# Patient Record
Sex: MALE | Race: BLACK OR AFRICAN AMERICAN | NOT HISPANIC OR LATINO | Employment: OTHER | URBAN - METROPOLITAN AREA
[De-identification: names, ages, dates, MRNs, and addresses within clinical notes are randomized per-mention and may not be internally consistent; named-entity substitution may affect disease eponyms.]

---

## 2022-12-02 ENCOUNTER — OFFICE VISIT (OUTPATIENT)
Dept: GASTROENTEROLOGY | Facility: CLINIC | Age: 37
End: 2022-12-02

## 2022-12-02 VITALS
SYSTOLIC BLOOD PRESSURE: 159 MMHG | HEIGHT: 70 IN | HEART RATE: 142 BPM | TEMPERATURE: 97.8 F | DIASTOLIC BLOOD PRESSURE: 90 MMHG | WEIGHT: 191.9 LBS | BODY MASS INDEX: 27.47 KG/M2

## 2022-12-02 DIAGNOSIS — K82.4 GALLBLADDER POLYP: ICD-10-CM

## 2022-12-02 DIAGNOSIS — R10.9 ABDOMINAL SPASMS: Primary | ICD-10-CM

## 2022-12-02 PROCEDURE — 99204 OFFICE O/P NEW MOD 45 MIN: CPT | Performed by: INTERNAL MEDICINE

## 2022-12-02 RX ORDER — LANOLIN ALCOHOL/MO/W.PET/CERES
1000 CREAM (GRAM) TOPICAL DAILY
COMMUNITY
Start: 2022-11-07

## 2022-12-02 RX ORDER — ERGOCALCIFEROL 1.25 MG/1
CAPSULE ORAL
COMMUNITY
Start: 2022-09-15 | End: 2022-12-20 | Stop reason: ALTCHOICE

## 2022-12-02 RX ORDER — CYCLOBENZAPRINE HCL 5 MG
TABLET ORAL
COMMUNITY
Start: 2022-10-15 | End: 2022-12-20

## 2022-12-02 RX ORDER — PRAVASTATIN SODIUM 20 MG
20 TABLET ORAL DAILY
COMMUNITY
Start: 2022-09-15

## 2022-12-02 NOTE — PROGRESS NOTES
Chief Complaint   Patient presents with   • Abdominal Pain   • Cholelithiasis     Subjective   HPI  De Cahu is a 37 y.o. male who presents today for new patient evaluation.  He has been referred by his PCP for evaluation of abnormal ultrasound of the gallbladder.  The ultrasound was performed at [facility] and the report is noted below.  Ultrasound was performed due to complaint of intermittent spasm in the right upper quadrant.  This been going on for the last 4 months.  This is a transient sensation sometimes lasting only minutes.  Sometimes food related but not always.  No associated nausea or vomiting.  Denies any family history of gallbladder disease.  He reports some spasm in his lower back but nothing in his scapular area.  No heartburn or dyspepsia.        Exam: Ultrasound abdomen, complete exam.  DATE: 11/13/2022.    HISTORY: Abdominal pain    FINDINGS:   The liver appears grossly unremarkable. There are no focal hepatic abnormalities.   Multiple small echogenic nonshadowing foci are seen along the gallbladder wall measuring up to 10 mm in diameter, most consistent with gallbladder polyps. Alternatively this may represent focal adherent sludge.  The common bile duct measures 4 mm.   The pancreas is suboptimally visualized.  The kidneys appear grossly unremarkable.  The visualized portions of the spleen, aorta and IVC are normal.  There is no ascites.    IMPRESSION:     Multiple small echogenic nonshadowing foci are seen along the gallbladder wall measuring up to 10 mm in diameter, most consistent with gallbladder polyps. Alternatively this may represent focal adherent sludge.    Otherwise unremarkable exam.  Objective   Vitals:    12/02/22 0814   BP: 159/90   Pulse: (!) 142   Temp: 97.8 °F (36.6 °C)     Physical Exam  Vitals reviewed.   Constitutional:       Appearance: He is well-developed.   HENT:      Head: Normocephalic and atraumatic.   Abdominal:      General: Bowel sounds are normal.  There is no distension.      Palpations: Abdomen is soft. There is no mass.      Tenderness: There is no abdominal tenderness.      Hernia: No hernia is present.   Skin:     General: Skin is warm and dry.   Neurological:      Mental Status: He is alert and oriented to person, place, and time.   Psychiatric:         Behavior: Behavior normal.         Thought Content: Thought content normal.         Judgment: Judgment normal.              Assessment & Plan   Assessment:     1. Abdominal spasms    2. Gallbladder polyp      Plan:   Trial of Levsin SL for his c/o intermittent RUQ spasm.  Unclear if this is related to findings on RUQ u/s.  He will however need assessment by general surgery for gallbladder findings and to determine if lap aj is indicated.  Referral to [facility] has been made.              Joel Guillermo M.D.  [facility]  [address] [place] [address]  Office: [phone]

## 2022-12-20 ENCOUNTER — OFFICE VISIT (OUTPATIENT)
Dept: SURGERY | Facility: CLINIC | Age: 37
End: 2022-12-20

## 2022-12-20 VITALS — BODY MASS INDEX: 27.29 KG/M2 | HEIGHT: 70 IN | WEIGHT: 190.6 LBS

## 2022-12-20 DIAGNOSIS — K82.4 GALLBLADDER POLYP: Primary | ICD-10-CM

## 2022-12-20 PROCEDURE — 99203 OFFICE O/P NEW LOW 30 MIN: CPT | Performed by: SURGERY

## 2022-12-20 RX ORDER — CHLORAL HYDRATE 500 MG
CAPSULE ORAL
COMMUNITY

## 2022-12-20 RX ORDER — SODIUM CHLORIDE 0.9 % (FLUSH) 0.9 %
10 SYRINGE (ML) INJECTION EVERY 12 HOURS SCHEDULED
Status: CANCELLED | OUTPATIENT
Start: 2022-12-28

## 2022-12-20 RX ORDER — SODIUM CHLORIDE 9 MG/ML
40 INJECTION, SOLUTION INTRAVENOUS AS NEEDED
Status: CANCELLED | OUTPATIENT
Start: 2022-12-28

## 2022-12-20 RX ORDER — CEFAZOLIN SODIUM 2 G/100ML
2 INJECTION, SOLUTION INTRAVENOUS ONCE
Status: CANCELLED | OUTPATIENT
Start: 2022-12-28 | End: 2022-12-20

## 2022-12-20 RX ORDER — CELECOXIB 200 MG/1
200 CAPSULE ORAL ONCE
Status: CANCELLED | OUTPATIENT
Start: 2022-12-28 | End: 2022-12-20

## 2022-12-20 RX ORDER — SODIUM CHLORIDE 0.9 % (FLUSH) 0.9 %
10 SYRINGE (ML) INJECTION AS NEEDED
Status: CANCELLED | OUTPATIENT
Start: 2022-12-28

## 2022-12-20 RX ORDER — ONDANSETRON 2 MG/ML
4 INJECTION INTRAMUSCULAR; INTRAVENOUS EVERY 6 HOURS PRN
Status: CANCELLED | OUTPATIENT
Start: 2022-12-20

## 2022-12-20 NOTE — PROGRESS NOTES
"CC: Gallbladder polyp     HPI: 37-year-old male that was referred by Dr. Mann for evaluation of gallbladder polyp.  Patient has been having intermittent episodes of sharp right upper quadrant abdominal pain.  The pain last for several seconds and usually go away with time.  He has not been associated with any nausea or vomiting.  The patient underwent right upper quadrant ultrasound that show evidence of clumped stones versus 10 mm gallbladder polyp.  He denies any recent weight loss.  He was seen by Dr. Mann the recommended Levsin.  He reports improvement of his symptoms     PMH: High cholesterol     PSH: Open appendectomy 1995    MEDS: Omega-3 fatty acids, Pravachol, vitamin B12, vitamin D, Levsin     ALL: No known drug allergies    FH and SH: Family history of colon polyps, kidney cancer in his mom.  Family history of colon cancer in a paternal aunt.  Patient smokes marijuana daily, denies alcohol abuse or any other drugs use     ROS:   Constitutional: denies any weight changes, fatigue or weakness.  HEENT: Denies hearing loss and rhinorrhea  Cardiovascular: denies chest pain, palpitations, edemas.  Respiratory: denies cough, sputum, SOB.  Gastrointestinal: denies N&V, reports abd pain, diarrhea, constipation.  Genitourinary: denies dysuria, frequency.  Endocrine: denies cold intolerance, lethargy and flushing.  Hem: denies excessive bruising and postop bleeding.  Musculoskeletal: denies weakness, joint swelling, pain or stiffness.  Neuro: denies seizures, CVA, paresthesia, or peripheral neuropathy.   Skin: denies change in nevi, rashes, masses.     PE:   Vitals:    12/20/22 1256   Weight: 86.5 kg (190 lb 9.6 oz)   Height: 177.8 cm (70\")     Body mass index is 27.35 kg/m².   Alert and oriented ×3, no acute distress.  Head is normocephalic and atraumatic.  Neck is supple there is no thyromegaly or lymphadenopathy  Chest is clear bilaterally there is no added sounds  Regular rate and rhythm, no " murmurs  Abdomen is soft and nontender, is nondistended, bowel sounds are positive.  There is no rebound or guarding is and there is no peritoneal signs.  There is a well-healed infraumbilical incision.  Small umbilical hernia  No clubbing cyanosis or edema in lower or upper extremities    Diagnostic studies:   Ultrasound 11/13/2022:   IMPRESSION:     Multiple small echogenic nonshadowing foci are seen along the gallbladder wall measuring up to 10 mm in diameter, most consistent with gallbladder polyps. Alternatively this may represent focal adherent sludge.     Otherwise unremarkable exam.     Assessment and plan    The patient is a very pleasant with gallbladder polyp of 1 cm.  His symptoms do not seem consistent with gallbladder disease.  Discussed with him that regardless because of the large polyp at the gallbladder done my recommendation for him will be to have laparoscopic cholecystectomy.   This is based on the fact that polyps are larger than 0.7 cm have higher risk of developing gallbladder cancer.  I offered patient laparoscopic cholecystectomy with intraoperative cholangiogram.  I offer him to fix his umbilical hernia at the same time if a port is placed at the time of surgery.  No mesh will be used.  He understood risk and benefits of procedure including bleeding, infection, intra-abdominal injury, bile duct injury, cystic duct leak discussed in detail with the patient that verbalized understanding and agree with the plan    Surgical scheduling started     Vince Jaramillo MD  General, Minimally Invasive and Endoscopic Surgery  [facility]     [address], [address]  [place], [place], [address]  P: [phone]  F: [phone]

## 2022-12-20 NOTE — H&P (VIEW-ONLY)
"CC: Gallbladder polyp     HPI: 37-year-old male that was referred by Dr. Mann for evaluation of gallbladder polyp.  Patient has been having intermittent episodes of sharp right upper quadrant abdominal pain.  The pain last for several seconds and usually go away with time.  He has not been associated with any nausea or vomiting.  The patient underwent right upper quadrant ultrasound that show evidence of clumped stones versus 10 mm gallbladder polyp.  He denies any recent weight loss.  He was seen by Dr. Mann the recommended Levsin.  He reports improvement of his symptoms     PMH: High cholesterol     PSH: Open appendectomy 1995    MEDS: Omega-3 fatty acids, Pravachol, vitamin B12, vitamin D, Levsin     ALL: No known drug allergies    FH and SH: Family history of colon polyps, kidney cancer in his mom.  Family history of colon cancer in a paternal aunt.  Patient smokes marijuana daily, denies alcohol abuse or any other drugs use     ROS:   Constitutional: denies any weight changes, fatigue or weakness.  HEENT: Denies hearing loss and rhinorrhea  Cardiovascular: denies chest pain, palpitations, edemas.  Respiratory: denies cough, sputum, SOB.  Gastrointestinal: denies N&V, reports abd pain, diarrhea, constipation.  Genitourinary: denies dysuria, frequency.  Endocrine: denies cold intolerance, lethargy and flushing.  Hem: denies excessive bruising and postop bleeding.  Musculoskeletal: denies weakness, joint swelling, pain or stiffness.  Neuro: denies seizures, CVA, paresthesia, or peripheral neuropathy.   Skin: denies change in nevi, rashes, masses.     PE:   Vitals:    12/20/22 1256   Weight: 86.5 kg (190 lb 9.6 oz)   Height: 177.8 cm (70\")     Body mass index is 27.35 kg/m².   Alert and oriented ×3, no acute distress.  Head is normocephalic and atraumatic.  Neck is supple there is no thyromegaly or lymphadenopathy  Chest is clear bilaterally there is no added sounds  Regular rate and rhythm, no " murmurs  Abdomen is soft and nontender, is nondistended, bowel sounds are positive.  There is no rebound or guarding is and there is no peritoneal signs.  There is a well-healed infraumbilical incision.  Small umbilical hernia  No clubbing cyanosis or edema in lower or upper extremities    Diagnostic studies:   Ultrasound 11/13/2022:   IMPRESSION:     Multiple small echogenic nonshadowing foci are seen along the gallbladder wall measuring up to 10 mm in diameter, most consistent with gallbladder polyps. Alternatively this may represent focal adherent sludge.     Otherwise unremarkable exam.     Assessment and plan    The patient is a very pleasant with gallbladder polyp of 1 cm.  His symptoms do not seem consistent with gallbladder disease.  Discussed with him that regardless because of the large polyp at the gallbladder done my recommendation for him will be to have laparoscopic cholecystectomy.   This is based on the fact that polyps are larger than 0.7 cm have higher risk of developing gallbladder cancer.  I offered patient laparoscopic cholecystectomy with intraoperative cholangiogram.  I offer him to fix his umbilical hernia at the same time if a port is placed at the time of surgery.  No mesh will be used.  He understood risk and benefits of procedure including bleeding, infection, intra-abdominal injury, bile duct injury, cystic duct leak discussed in detail with the patient that verbalized understanding and agree with the plan    Surgical scheduling started     Vince Jaramillo MD  General, Minimally Invasive and Endoscopic Surgery  [facility]     [address], [address]  [place], [place], [address]  P: [phone]  F: [phone]

## 2022-12-22 ENCOUNTER — PRE-ADMISSION TESTING (OUTPATIENT)
Dept: PREADMISSION TESTING | Facility: HOSPITAL | Age: 37
End: 2022-12-22

## 2022-12-22 VITALS
DIASTOLIC BLOOD PRESSURE: 70 MMHG | BODY MASS INDEX: 27.77 KG/M2 | OXYGEN SATURATION: 100 % | HEART RATE: 117 BPM | WEIGHT: 194 LBS | HEIGHT: 70 IN | RESPIRATION RATE: 16 BRPM | SYSTOLIC BLOOD PRESSURE: 119 MMHG | TEMPERATURE: 99.3 F

## 2022-12-22 DIAGNOSIS — K82.4 GALLBLADDER POLYP: ICD-10-CM

## 2022-12-22 LAB
ALBUMIN SERPL-MCNC: 5.1 G/DL (ref 3.5–5.2)
ALBUMIN/GLOB SERPL: 2.6 G/DL
ALP SERPL-CCNC: 59 U/L (ref 39–117)
ALT SERPL W P-5'-P-CCNC: 11 U/L (ref 1–41)
ANION GAP SERPL CALCULATED.3IONS-SCNC: 12.8 MMOL/L (ref 5–15)
AST SERPL-CCNC: 12 U/L (ref 1–40)
BILIRUB SERPL-MCNC: 0.4 MG/DL (ref 0–1.2)
BUN SERPL-MCNC: 9 MG/DL (ref 6–20)
BUN/CREAT SERPL: 7.7 (ref 7–25)
CALCIUM SPEC-SCNC: 9.7 MG/DL (ref 8.6–10.5)
CHLORIDE SERPL-SCNC: 104 MMOL/L (ref 98–107)
CO2 SERPL-SCNC: 26.2 MMOL/L (ref 22–29)
CREAT SERPL-MCNC: 1.17 MG/DL (ref 0.76–1.27)
DEPRECATED RDW RBC AUTO: 41.3 FL (ref 37–54)
EGFRCR SERPLBLD CKD-EPI 2021: 82.3 ML/MIN/1.73
ERYTHROCYTE [DISTWIDTH] IN BLOOD BY AUTOMATED COUNT: 13.3 % (ref 12.3–15.4)
GLOBULIN UR ELPH-MCNC: 2 GM/DL
GLUCOSE SERPL-MCNC: 102 MG/DL (ref 65–99)
HCT VFR BLD AUTO: 38.6 % (ref 37.5–51)
HGB BLD-MCNC: 12.7 G/DL (ref 13–17.7)
MCH RBC QN AUTO: 27.9 PG (ref 26.6–33)
MCHC RBC AUTO-ENTMCNC: 32.9 G/DL (ref 31.5–35.7)
MCV RBC AUTO: 84.6 FL (ref 79–97)
PLATELET # BLD AUTO: 324 10*3/MM3 (ref 140–450)
PMV BLD AUTO: 9.4 FL (ref 6–12)
POTASSIUM SERPL-SCNC: 4.2 MMOL/L (ref 3.5–5.2)
PROT SERPL-MCNC: 7.1 G/DL (ref 6–8.5)
QT INTERVAL: 341 MS
RBC # BLD AUTO: 4.56 10*6/MM3 (ref 4.14–5.8)
SODIUM SERPL-SCNC: 143 MMOL/L (ref 136–145)
WBC NRBC COR # BLD: 4.75 10*3/MM3 (ref 3.4–10.8)

## 2022-12-22 PROCEDURE — 36415 COLL VENOUS BLD VENIPUNCTURE: CPT

## 2022-12-22 PROCEDURE — 93005 ELECTROCARDIOGRAM TRACING: CPT

## 2022-12-22 PROCEDURE — 85027 COMPLETE CBC AUTOMATED: CPT

## 2022-12-22 PROCEDURE — 80053 COMPREHEN METABOLIC PANEL: CPT

## 2022-12-22 PROCEDURE — 93010 ELECTROCARDIOGRAM REPORT: CPT | Performed by: INTERNAL MEDICINE

## 2022-12-22 RX ORDER — CHLORHEXIDINE GLUCONATE 500 MG/1
CLOTH TOPICAL TAKE AS DIRECTED
COMMUNITY
End: 2022-12-28 | Stop reason: HOSPADM

## 2022-12-22 NOTE — DISCHARGE INSTRUCTIONS

## 2022-12-28 ENCOUNTER — ANESTHESIA (OUTPATIENT)
Dept: PERIOP | Facility: HOSPITAL | Age: 37
End: 2022-12-28

## 2022-12-28 ENCOUNTER — APPOINTMENT (OUTPATIENT)
Dept: GENERAL RADIOLOGY | Facility: HOSPITAL | Age: 37
End: 2022-12-28

## 2022-12-28 ENCOUNTER — HOSPITAL ENCOUNTER (OUTPATIENT)
Facility: HOSPITAL | Age: 37
Setting detail: HOSPITAL OUTPATIENT SURGERY
Discharge: HOME | End: 2022-12-28
Attending: SURGERY | Admitting: SURGERY

## 2022-12-28 ENCOUNTER — ANESTHESIA EVENT (OUTPATIENT)
Dept: PERIOP | Facility: HOSPITAL | Age: 37
End: 2022-12-28

## 2022-12-28 VITALS
BODY MASS INDEX: 27.84 KG/M2 | RESPIRATION RATE: 16 BRPM | DIASTOLIC BLOOD PRESSURE: 84 MMHG | HEIGHT: 70 IN | OXYGEN SATURATION: 96 % | SYSTOLIC BLOOD PRESSURE: 147 MMHG | HEART RATE: 76 BPM | TEMPERATURE: 98.7 F

## 2022-12-28 DIAGNOSIS — K82.4 GALLBLADDER POLYP: ICD-10-CM

## 2022-12-28 PROCEDURE — 25010000002 HYDROMORPHONE PER 4 MG: Performed by: NURSE ANESTHETIST, CERTIFIED REGISTERED

## 2022-12-28 PROCEDURE — 25010000002 MIDAZOLAM PER 1 MG: Performed by: ANESTHESIOLOGY

## 2022-12-28 PROCEDURE — 74300 X-RAY BILE DUCTS/PANCREAS: CPT

## 2022-12-28 PROCEDURE — 25010000002 PROPOFOL 10 MG/ML EMULSION: Performed by: NURSE ANESTHETIST, CERTIFIED REGISTERED

## 2022-12-28 PROCEDURE — 25010000002 HYDROMORPHONE 1 MG/ML SOLUTION: Performed by: NURSE ANESTHETIST, CERTIFIED REGISTERED

## 2022-12-28 PROCEDURE — 25010000002 ONDANSETRON PER 1 MG: Performed by: NURSE ANESTHETIST, CERTIFIED REGISTERED

## 2022-12-28 PROCEDURE — 25010000002 FENTANYL CITRATE (PF) 50 MCG/ML SOLUTION: Performed by: NURSE ANESTHETIST, CERTIFIED REGISTERED

## 2022-12-28 PROCEDURE — 25010000002 NEOSTIGMINE 5 MG/10ML SOLUTION: Performed by: NURSE ANESTHETIST, CERTIFIED REGISTERED

## 2022-12-28 PROCEDURE — 25010000002 CEFAZOLIN IN DEXTROSE 2-4 GM/100ML-% SOLUTION: Performed by: SURGERY

## 2022-12-28 PROCEDURE — 0 IOTHALAMATE 60 % SOLUTION: Performed by: SURGERY

## 2022-12-28 PROCEDURE — 47563 LAPARO CHOLECYSTECTOMY/GRAPH: CPT | Performed by: SURGERY

## 2022-12-28 PROCEDURE — 88304 TISSUE EXAM BY PATHOLOGIST: CPT | Performed by: SURGERY

## 2022-12-28 PROCEDURE — 47563 LAPARO CHOLECYSTECTOMY/GRAPH: CPT | Performed by: SPECIALIST/TECHNOLOGIST, OTHER

## 2022-12-28 PROCEDURE — 25010000002 DEXAMETHASONE SODIUM PHOSPHATE 20 MG/5ML SOLUTION: Performed by: NURSE ANESTHETIST, CERTIFIED REGISTERED

## 2022-12-28 DEVICE — HEMOLOK ML 6 CLIPS/CART
Type: IMPLANTABLE DEVICE | Site: ABDOMEN | Status: FUNCTIONAL
Brand: WECK

## 2022-12-28 DEVICE — HORIZON TI ML 6 CLIPS/CART
Type: IMPLANTABLE DEVICE | Site: ABDOMEN | Status: FUNCTIONAL
Brand: WECK

## 2022-12-28 RX ORDER — HYDROMORPHONE HYDROCHLORIDE 1 MG/ML
0.5 INJECTION, SOLUTION INTRAMUSCULAR; INTRAVENOUS; SUBCUTANEOUS
Status: DISCONTINUED | OUTPATIENT
Start: 2022-12-28 | End: 2022-12-28 | Stop reason: HOSPADM

## 2022-12-28 RX ORDER — HYDROCODONE BITARTRATE AND ACETAMINOPHEN 7.5; 325 MG/1; MG/1
1 TABLET ORAL ONCE AS NEEDED
Status: DISCONTINUED | OUTPATIENT
Start: 2022-12-28 | End: 2022-12-28 | Stop reason: HOSPADM

## 2022-12-28 RX ORDER — SODIUM CHLORIDE, SODIUM LACTATE, POTASSIUM CHLORIDE, CALCIUM CHLORIDE 600; 310; 30; 20 MG/100ML; MG/100ML; MG/100ML; MG/100ML
9 INJECTION, SOLUTION INTRAVENOUS CONTINUOUS PRN
Status: DISCONTINUED | OUTPATIENT
Start: 2022-12-28 | End: 2022-12-28 | Stop reason: HOSPADM

## 2022-12-28 RX ORDER — NALOXONE HCL 0.4 MG/ML
0.2 VIAL (ML) INJECTION AS NEEDED
Status: DISCONTINUED | OUTPATIENT
Start: 2022-12-28 | End: 2022-12-28 | Stop reason: HOSPADM

## 2022-12-28 RX ORDER — MAGNESIUM HYDROXIDE 1200 MG/15ML
LIQUID ORAL AS NEEDED
Status: DISCONTINUED | OUTPATIENT
Start: 2022-12-28 | End: 2022-12-28 | Stop reason: HOSPADM

## 2022-12-28 RX ORDER — FAMOTIDINE 10 MG/ML
20 INJECTION, SOLUTION INTRAVENOUS
Status: COMPLETED | OUTPATIENT
Start: 2022-12-28 | End: 2022-12-28

## 2022-12-28 RX ORDER — SODIUM CHLORIDE 9 MG/ML
40 INJECTION, SOLUTION INTRAVENOUS AS NEEDED
Status: DISCONTINUED | OUTPATIENT
Start: 2022-12-28 | End: 2022-12-28 | Stop reason: HOSPADM

## 2022-12-28 RX ORDER — DIPHENHYDRAMINE HYDROCHLORIDE 50 MG/ML
12.5 INJECTION INTRAMUSCULAR; INTRAVENOUS
Status: DISCONTINUED | OUTPATIENT
Start: 2022-12-28 | End: 2022-12-28 | Stop reason: HOSPADM

## 2022-12-28 RX ORDER — GLYCOPYRROLATE 0.2 MG/ML
INJECTION INTRAMUSCULAR; INTRAVENOUS AS NEEDED
Status: DISCONTINUED | OUTPATIENT
Start: 2022-12-28 | End: 2022-12-28 | Stop reason: SURG

## 2022-12-28 RX ORDER — DEXAMETHASONE SODIUM PHOSPHATE 4 MG/ML
INJECTION, SOLUTION INTRA-ARTICULAR; INTRALESIONAL; INTRAMUSCULAR; INTRAVENOUS; SOFT TISSUE AS NEEDED
Status: DISCONTINUED | OUTPATIENT
Start: 2022-12-28 | End: 2022-12-28 | Stop reason: SURG

## 2022-12-28 RX ORDER — OXYCODONE HYDROCHLORIDE AND ACETAMINOPHEN 5; 325 MG/1; MG/1
1 TABLET ORAL EVERY 6 HOURS PRN
Qty: 20 TABLET | Refills: 0 | Status: SHIPPED | OUTPATIENT
Start: 2022-12-28

## 2022-12-28 RX ORDER — FENTANYL CITRATE 50 UG/ML
50 INJECTION, SOLUTION INTRAMUSCULAR; INTRAVENOUS
Status: DISCONTINUED | OUTPATIENT
Start: 2022-12-28 | End: 2022-12-28 | Stop reason: HOSPADM

## 2022-12-28 RX ORDER — ESMOLOL HYDROCHLORIDE 10 MG/ML
INJECTION INTRAVENOUS AS NEEDED
Status: DISCONTINUED | OUTPATIENT
Start: 2022-12-28 | End: 2022-12-28 | Stop reason: SURG

## 2022-12-28 RX ORDER — PROMETHAZINE HYDROCHLORIDE 25 MG/1
12.5 TABLET ORAL ONCE AS NEEDED
Status: DISCONTINUED | OUTPATIENT
Start: 2022-12-28 | End: 2022-12-28 | Stop reason: HOSPADM

## 2022-12-28 RX ORDER — MIDAZOLAM HYDROCHLORIDE 1 MG/ML
1 INJECTION INTRAMUSCULAR; INTRAVENOUS
Status: DISCONTINUED | OUTPATIENT
Start: 2022-12-28 | End: 2022-12-28 | Stop reason: HOSPADM

## 2022-12-28 RX ORDER — SODIUM CHLORIDE 0.9 % (FLUSH) 0.9 %
10 SYRINGE (ML) INJECTION AS NEEDED
Status: DISCONTINUED | OUTPATIENT
Start: 2022-12-28 | End: 2022-12-28 | Stop reason: HOSPADM

## 2022-12-28 RX ORDER — BUPIVACAINE HYDROCHLORIDE AND EPINEPHRINE 5; 5 MG/ML; UG/ML
INJECTION, SOLUTION EPIDURAL; INTRACAUDAL; PERINEURAL AS NEEDED
Status: DISCONTINUED | OUTPATIENT
Start: 2022-12-28 | End: 2022-12-28 | Stop reason: HOSPADM

## 2022-12-28 RX ORDER — AMOXICILLIN 250 MG
2 CAPSULE ORAL DAILY PRN
Qty: 30 TABLET | Refills: 1 | Status: SHIPPED | OUTPATIENT
Start: 2022-12-28 | End: 2023-12-28

## 2022-12-28 RX ORDER — OXYCODONE AND ACETAMINOPHEN 7.5; 325 MG/1; MG/1
1 TABLET ORAL EVERY 4 HOURS PRN
Status: DISCONTINUED | OUTPATIENT
Start: 2022-12-28 | End: 2022-12-28 | Stop reason: HOSPADM

## 2022-12-28 RX ORDER — NEOSTIGMINE METHYLSULFATE 0.5 MG/ML
INJECTION, SOLUTION INTRAVENOUS AS NEEDED
Status: DISCONTINUED | OUTPATIENT
Start: 2022-12-28 | End: 2022-12-28 | Stop reason: SURG

## 2022-12-28 RX ORDER — EPHEDRINE SULFATE 50 MG/ML
5 INJECTION, SOLUTION INTRAVENOUS ONCE AS NEEDED
Status: DISCONTINUED | OUTPATIENT
Start: 2022-12-28 | End: 2022-12-28 | Stop reason: HOSPADM

## 2022-12-28 RX ORDER — SODIUM CHLORIDE 9 MG/ML
INJECTION, SOLUTION INTRAVENOUS AS NEEDED
Status: DISCONTINUED | OUTPATIENT
Start: 2022-12-28 | End: 2022-12-28 | Stop reason: HOSPADM

## 2022-12-28 RX ORDER — CELECOXIB 200 MG/1
200 CAPSULE ORAL ONCE
Status: COMPLETED | OUTPATIENT
Start: 2022-12-28 | End: 2022-12-28

## 2022-12-28 RX ORDER — HYDRALAZINE HYDROCHLORIDE 20 MG/ML
5 INJECTION INTRAMUSCULAR; INTRAVENOUS
Status: DISCONTINUED | OUTPATIENT
Start: 2022-12-28 | End: 2022-12-28 | Stop reason: HOSPADM

## 2022-12-28 RX ORDER — LABETALOL HYDROCHLORIDE 5 MG/ML
5 INJECTION, SOLUTION INTRAVENOUS
Status: DISCONTINUED | OUTPATIENT
Start: 2022-12-28 | End: 2022-12-28 | Stop reason: HOSPADM

## 2022-12-28 RX ORDER — FLUMAZENIL 0.1 MG/ML
0.2 INJECTION INTRAVENOUS AS NEEDED
Status: DISCONTINUED | OUTPATIENT
Start: 2022-12-28 | End: 2022-12-28 | Stop reason: HOSPADM

## 2022-12-28 RX ORDER — PROMETHAZINE HYDROCHLORIDE 25 MG/1
25 SUPPOSITORY RECTAL ONCE AS NEEDED
Status: DISCONTINUED | OUTPATIENT
Start: 2022-12-28 | End: 2022-12-28 | Stop reason: HOSPADM

## 2022-12-28 RX ORDER — ONDANSETRON 2 MG/ML
4 INJECTION INTRAMUSCULAR; INTRAVENOUS EVERY 6 HOURS PRN
Status: DISCONTINUED | OUTPATIENT
Start: 2022-12-28 | End: 2022-12-28 | Stop reason: HOSPADM

## 2022-12-28 RX ORDER — PROMETHAZINE HYDROCHLORIDE 25 MG/1
25 TABLET ORAL ONCE AS NEEDED
Status: DISCONTINUED | OUTPATIENT
Start: 2022-12-28 | End: 2022-12-28 | Stop reason: HOSPADM

## 2022-12-28 RX ORDER — DIPHENHYDRAMINE HCL 25 MG
25 CAPSULE ORAL
Status: DISCONTINUED | OUTPATIENT
Start: 2022-12-28 | End: 2022-12-28 | Stop reason: HOSPADM

## 2022-12-28 RX ORDER — ONDANSETRON 4 MG/1
4 TABLET, FILM COATED ORAL EVERY 8 HOURS PRN
Qty: 10 TABLET | Refills: 0 | Status: SHIPPED | OUTPATIENT
Start: 2022-12-28 | End: 2023-12-28

## 2022-12-28 RX ORDER — PROPOFOL 10 MG/ML
VIAL (ML) INTRAVENOUS AS NEEDED
Status: DISCONTINUED | OUTPATIENT
Start: 2022-12-28 | End: 2022-12-28 | Stop reason: SURG

## 2022-12-28 RX ORDER — SODIUM CHLORIDE 0.9 % (FLUSH) 0.9 %
10 SYRINGE (ML) INJECTION EVERY 12 HOURS SCHEDULED
Status: DISCONTINUED | OUTPATIENT
Start: 2022-12-28 | End: 2022-12-28 | Stop reason: HOSPADM

## 2022-12-28 RX ORDER — FENTANYL CITRATE 50 UG/ML
INJECTION, SOLUTION INTRAMUSCULAR; INTRAVENOUS AS NEEDED
Status: DISCONTINUED | OUTPATIENT
Start: 2022-12-28 | End: 2022-12-28 | Stop reason: SURG

## 2022-12-28 RX ORDER — ONDANSETRON 2 MG/ML
INJECTION INTRAMUSCULAR; INTRAVENOUS AS NEEDED
Status: DISCONTINUED | OUTPATIENT
Start: 2022-12-28 | End: 2022-12-28 | Stop reason: SURG

## 2022-12-28 RX ORDER — CEFAZOLIN SODIUM 2 G/100ML
2 INJECTION, SOLUTION INTRAVENOUS ONCE
Status: COMPLETED | OUTPATIENT
Start: 2022-12-28 | End: 2022-12-28

## 2022-12-28 RX ORDER — LIDOCAINE HYDROCHLORIDE 20 MG/ML
INJECTION, SOLUTION INTRAVENOUS AS NEEDED
Status: DISCONTINUED | OUTPATIENT
Start: 2022-12-28 | End: 2022-12-28 | Stop reason: SURG

## 2022-12-28 RX ORDER — ROCURONIUM BROMIDE 10 MG/ML
INJECTION, SOLUTION INTRAVENOUS AS NEEDED
Status: DISCONTINUED | OUTPATIENT
Start: 2022-12-28 | End: 2022-12-28 | Stop reason: SURG

## 2022-12-28 RX ORDER — PHENYLEPHRINE HCL IN 0.9% NACL 1 MG/10 ML
SYRINGE (ML) INTRAVENOUS AS NEEDED
Status: DISCONTINUED | OUTPATIENT
Start: 2022-12-28 | End: 2022-12-28 | Stop reason: SURG

## 2022-12-28 RX ORDER — ONDANSETRON 2 MG/ML
4 INJECTION INTRAMUSCULAR; INTRAVENOUS ONCE AS NEEDED
Status: DISCONTINUED | OUTPATIENT
Start: 2022-12-28 | End: 2022-12-28 | Stop reason: HOSPADM

## 2022-12-28 RX ORDER — DOCUSATE SODIUM 100 MG/1
100 CAPSULE, LIQUID FILLED ORAL 2 TIMES DAILY PRN
Status: DISCONTINUED | OUTPATIENT
Start: 2022-12-28 | End: 2022-12-28 | Stop reason: HOSPADM

## 2022-12-28 RX ADMIN — FENTANYL CITRATE 50 MCG: 50 INJECTION, SOLUTION INTRAMUSCULAR; INTRAVENOUS at 11:29

## 2022-12-28 RX ADMIN — PROPOFOL 200 MG: 10 INJECTION, EMULSION INTRAVENOUS at 09:56

## 2022-12-28 RX ADMIN — FAMOTIDINE 20 MG: 10 INJECTION INTRAVENOUS at 08:45

## 2022-12-28 RX ADMIN — HYDROMORPHONE HYDROCHLORIDE 0.5 MG: 1 INJECTION, SOLUTION INTRAMUSCULAR; INTRAVENOUS; SUBCUTANEOUS at 11:45

## 2022-12-28 RX ADMIN — HYDROMORPHONE HYDROCHLORIDE 1 MG: 1 INJECTION, SOLUTION INTRAMUSCULAR; INTRAVENOUS; SUBCUTANEOUS at 09:53

## 2022-12-28 RX ADMIN — ROCURONIUM BROMIDE 40 MG: 10 INJECTION, SOLUTION INTRAVENOUS at 09:56

## 2022-12-28 RX ADMIN — ESMOLOL HYDROCHLORIDE 10 MG: 100 INJECTION, SOLUTION INTRAVENOUS at 10:13

## 2022-12-28 RX ADMIN — SODIUM CHLORIDE, POTASSIUM CHLORIDE, SODIUM LACTATE AND CALCIUM CHLORIDE: 600; 310; 30; 20 INJECTION, SOLUTION INTRAVENOUS at 10:58

## 2022-12-28 RX ADMIN — DEXAMETHASONE SODIUM PHOSPHATE 8 MG: 4 INJECTION, SOLUTION INTRAMUSCULAR; INTRAVENOUS at 10:06

## 2022-12-28 RX ADMIN — CELECOXIB 200 MG: 200 CAPSULE ORAL at 08:33

## 2022-12-28 RX ADMIN — ESMOLOL HYDROCHLORIDE 10 MG: 100 INJECTION, SOLUTION INTRAVENOUS at 10:04

## 2022-12-28 RX ADMIN — OXYCODONE HYDROCHLORIDE AND ACETAMINOPHEN 1 TABLET: 7.5; 325 TABLET ORAL at 11:24

## 2022-12-28 RX ADMIN — FENTANYL CITRATE 50 MCG: 50 INJECTION, SOLUTION INTRAMUSCULAR; INTRAVENOUS at 10:58

## 2022-12-28 RX ADMIN — MIDAZOLAM 1 MG: 1 INJECTION INTRAMUSCULAR; INTRAVENOUS at 09:34

## 2022-12-28 RX ADMIN — NEOSTIGMINE METHYLSULFATE 3 MG: 0.5 INJECTION INTRAVENOUS at 10:56

## 2022-12-28 RX ADMIN — Medication 100 MCG: at 10:31

## 2022-12-28 RX ADMIN — GLYCOPYRROLATE 0.6 MCG: 1 INJECTION INTRAMUSCULAR; INTRAVENOUS at 10:56

## 2022-12-28 RX ADMIN — ONDANSETRON 4 MG: 2 INJECTION INTRAMUSCULAR; INTRAVENOUS at 10:56

## 2022-12-28 RX ADMIN — SODIUM CHLORIDE, POTASSIUM CHLORIDE, SODIUM LACTATE AND CALCIUM CHLORIDE 9 ML/HR: 600; 310; 30; 20 INJECTION, SOLUTION INTRAVENOUS at 08:36

## 2022-12-28 RX ADMIN — HYDROMORPHONE HYDROCHLORIDE 0.5 MG: 1 INJECTION, SOLUTION INTRAMUSCULAR; INTRAVENOUS; SUBCUTANEOUS at 11:33

## 2022-12-28 RX ADMIN — CEFAZOLIN SODIUM 2 G: 2 INJECTION, SOLUTION INTRAVENOUS at 09:44

## 2022-12-28 RX ADMIN — Medication 100 MCG: at 10:34

## 2022-12-28 RX ADMIN — LIDOCAINE HYDROCHLORIDE 100 MG: 20 INJECTION, SOLUTION INTRAVENOUS at 09:56

## 2022-12-28 NOTE — DISCHARGE INSTRUCTIONS
Dr. Vince Jaramillo  [address] [facility] [address]  [place] [id number] [phone]    Discharge Instructions for Gall Bladder Surgery      Go home, rest and take it easy today; however, you should get up and move about several times today to reduce the risk of developing a clot in your legs.      You may experience some dizziness or memory loss from the anesthesia.  This may last for the next 24 hours.  Someone should plan on staying with you for the first 24 hours for your safety.    Do not make any important legal decisions or sign any legal papers for the next 24 hours.      Eat and drink lightly today.  Start off with liquids, jello, soup, crackers or other bland foods at first. You may advance your diet tomorrow as tolerated as long as you do not experience any nausea or vomiting.     You may not have any dressing if surgical glue was used to close your incision. Keep your wound clean and dry at all times. Do not apply any cleaning products or Q-tips to the incisions.      If dressings were used, you may remove your outer dressings in 3 days.  The white tapes called steri-strips should stay in place.  They will fall off on their own in 1-2 weeks.  Do not worry if they come off sooner.      You may notice some bleeding/drainage on your outer dressings or incisions. A little bloody drainage is normal. If the bleeding/drainage is such that the bandage cannot absorb, remove the dressing if used, apply clean gauze and apply firm pressure for a full 15 minutes.  If the bleeding continues, please call me.    You may shower tomorrow, do not rub the incisions.  No tub baths until your incisions are completely healed.    You have received a prescription for a narcotic pain medicine, as you will have some pain following surgery.   You will not be totally pain free, but your pain medicine should make the pain tolerable.  Please take your pain medicine as prescribed and always take your pills with food to prevent nausea. If  you are having severe pain that cannot be controlled by the pain medicine, please contact me.      You have also received a prescription for an anti-nausea medicine.  Please take this as prescribed for any nausea or vomiting.  Nausea could be a result of the anesthesia or a result of the narcotic pain medicine.  If you experience severe nausea and vomiting that cannot be controlled by the nausea medicine, please call me.      It is not unusual to experience pain/discomfort in your shoulders or under your ribs after surgery.  It is from the gas used during the laparoscopic procedure and usually lasts 1-3 days.  The prescription pain medicine is used to treat the surgical pain and does not typically alleviate this “gassy” pain.     No driving for 24 hours and for as long as you are taking your prescription pain medicine.      You will need to call the office at [phone] to schedule a follow-up appointment in 6-10 days.     Remember to contact me for any of the following:    Fever > 101 degrees  Severe pain that cannot be controlled by taking your pain pills  Severe nausea or vomiting that cannot be controlled by taking your nausea pills  Significant bleeding of your incisions  Drainage that has a bad smell or is yellow or green in appearance  Any other questions or concerns

## 2022-12-28 NOTE — ANESTHESIA PREPROCEDURE EVALUATION
Anesthesia Evaluation     Patient summary reviewed   NPO Solid Status: > 8 hours             Airway   Mallampati: II  No difficulty expected  Dental      Pulmonary     breath sounds clear to auscultation  Cardiovascular     Rhythm: regular        Neuro/Psych  GI/Hepatic/Renal/Endo      Musculoskeletal     Abdominal    Substance History      OB/GYN          Other                        Anesthesia Plan    ASA 2     general       Anesthetic plan, risks, benefits, and alternatives have been provided, discussed and informed consent has been obtained with: patient.    Use of blood products discussed with patient .       CODE STATUS:

## 2022-12-28 NOTE — ANESTHESIA POSTPROCEDURE EVALUATION
Patient: De Chau    Procedure Summary     Date: 12/28/22 Room / Location: [facility] OR [address] [place] [address] MAIN OR    Anesthesia Start: 0950 Anesthesia Stop: 1114    Procedure: CHOLECYSTECTOMY LAPAROSCOPIC INTRAOPERATIVE CHOLANGIOGRAM (Abdomen) Diagnosis:       Gallbladder polyp      (Gallbladder polyp [K82.4])    Surgeons: Vince Jaramillo MD Provider: Benigno Ta MD    Anesthesia Type: general ASA Status: 2          Anesthesia Type: general    Vitals  Vitals Value Taken Time   /89 12/28/22 1200   Temp 37.1 °C (98.7 °F) 12/28/22 1112   Pulse 81 12/28/22 1212   Resp 16 12/28/22 1200   SpO2 96 % 12/28/22 1212   Vitals shown include unvalidated device data.        Post Anesthesia Care and Evaluation    Patient location during evaluation: PHASE II  Patient participation: complete - patient participated  Level of consciousness: awake  Pain management: satisfactory to patient    Airway patency: patent  Anesthetic complications: No anesthetic complications  PONV Status: controlled  Cardiovascular status: acceptable  Respiratory status: acceptable  Hydration status: acceptable

## 2022-12-28 NOTE — OP NOTE
PREOPERATIVE DIAGNOSIS: Large 1 cm gallbladder polyp  POSTOPERATIVE DIAGNOSIS: Cholelithiasis   PROCEDURE: Laparoscopic cholecystectomy with Intraoperative cholangiogram  SURGEON/STAFF: LIZZ Jaramillo    ASST: James Keith CSA that was in charge of retraction, exposure, holding camera, closing wounds and placing dressings that was essential for the success of the case  SPECIMENS: Gallbladder.  INTRAOPERATIVE COMPLICATIONS: None.  ANESTHESIA: General.  BLOOD LOSS:  minimal  COUNTS: Needle and sponge counts correct.   FINDINGS: Normal biliary tree, cholelithiasis    INDICATIONS: This is a pleasant patient who was diagnosed with a large gallbladder polyp that measured 1 cm. He was then seen in my clinic and feeling well save occasional RUQ abdominal pain.  I discussed with the patient about treatment options and I recommend he undergoes laparoscopic cholecystectomy with intraoperative cholangiogram due to the size of the polyp.  Risks and benefits of laparoscopic, open, and conservative management of the cholecystitis were discussed at length and in detail with the patient. This included detail drawings of the anatomy and possible postoperative complications including but not limited to bile leak, retained stone, bleeding and common bile duct injury. He agreed and was consented for operative management without duress.     PROCEDURE: The patient was brought to the operating room in stable condition. Perioperative antibiotics were given and sequential compression devices applied. He was then laid supine on the operating room table. General anesthesia was induced by the Anesthesia Service without difficulty.     At this time, the patient's abdomen was accessed at the supraumbilical area in open cutdown technique.  Small umbilical hernia was found and preperitoneal fat dissected.  I then inserted a 5 mm trocar. The abdomen was then insufflated. Brief survey of the abdominal cavity revealed no evidence of injury from insertion  of the trocar and adhesions from the small bowel to the lower abdominal wall.  There were no other intra-abdominal pathology.    At this time the patient was placed in steep reverse Trendelenburg and a slightly left-side down position. Two additional 5-mm trocars were placed at the right upper quadrant subcostal area midclavicular and anterior axillary line. Another 11 mm trocar was placed at the epigastric area.  This was under direct vision.       The peritoneal attachment of the gallbladder at the level of the infundibulum was scored from laterally to medially, terminating at the level of the hepatic edge. The peritoneum was gently stripped down, exposing the underlying cystic artery and cystic duct. This was also done on the lateral side of the gallbladder by reflecting the gallbladder medially. The peritoneal attachment here was again gently dissected inferiorly. After completely exposing the cystic duct as well as cystic artery, a retro-cystic window was created. These 2 structures, the cystic duct and cystic artery, were further delineated. A firm critical view was obtained, visualizing healthy-appearing hepatic tissue behind the 2 structures, with no evidence of looping, bridging or tenting of the common bile duct.     A Hemo-Lock clip was placed distally at the cystic duct and a cystic duct incision with laparoscopic scissors was performed. A 16 Fr sheath was placed through the patient abdominal wall and a Cholangio-catheter was inserted into the patient abdomen. The catheter was secured inside the cystic duct with a metallic clip. Cholangiogram was performed that showed filling of the cystic duct as well as the common bile duct and the right and left hepatic ducts. There’s prompt emptying of the contrast into the duodenum and there’s no evidence of filling defects. Next, the metallic clip was removed and the cystic duct was once again visualized and after confirming that it was indeed the cystic duct, it  was clipped twice proximally. It was then transected. Next, the cystic artery was clipped twice proximally and once distally. It was inspected and transected with scissors The gallbladder was then carefully dissected off the hepatic bed using hook electrocautery. It was firmly adherent to the underlying bed, and an effort careful and meticulous hemostasis was undertaken. The gallbladder, after being removed from the hepatic bed, was placed in an EndoCatch bag. It was removed through the epigastric trocar without difficulty.    A final complete survey of the abdominal cavity was undertaken, and there was no evidence of bleeding or intrabdominal injury. The 11 mm trocar was removed and the fascial defect was closed with 0 vicryl and endoclose technique.  The umbilical trocar was removed and the defect was closed with 0 Vicryl Endo Close technique.  At this time all 5-mm trocars in the upper abdomen were then removed under direct vision while desufflating the abdomen. The skin incisions were closed with 4-0 Monocryl and surgical glue. At the end of the case, all needle and sponge counts were correct. I, the attending surgeon, was scrubbed, present and persisted in the entire operation. The patient was extubated and taken to the recovery room in stable condition.    Vince Jaramillo MD

## 2022-12-28 NOTE — ANESTHESIA PROCEDURE NOTES
Airway  Urgency: elective    Date/Time: 12/28/2022 10:00 AM  Airway not difficult    General Information and Staff    Patient location during procedure: OR  Anesthesiologist: Benigno Ta MD  CRNA/CAA: Jj Medina CRNA    Indications and Patient Condition  Indications for airway management: airway protection    Preoxygenated: yes  MILS maintained throughout  Mask difficulty assessment: 1 - vent by mask    Final Airway Details  Final airway type: endotracheal airway      Successful airway: ETT  Cuffed: yes   Successful intubation technique: direct laryngoscopy  Facilitating devices/methods: intubating stylet  Endotracheal tube insertion site: oral  Blade: Guillen  Blade size: 2  ETT size (mm): 7.5  Cormack-Lehane Classification: grade I - full view of glottis  Placement verified by: chest auscultation and capnometry   Cuff volume (mL): 8  Measured from: lips  ETT/EBT  to lips (cm): 22  Number of attempts at approach: 1  Assessment: lips, teeth, and gum same as pre-op and atraumatic intubation

## 2022-12-29 LAB
LAB AP CASE REPORT: NORMAL
PATH REPORT.FINAL DX SPEC: NORMAL
PATH REPORT.GROSS SPEC: NORMAL

## 2024-12-06 ENCOUNTER — OFFICE VISIT (OUTPATIENT)
Dept: GASTROENTEROLOGY | Facility: CLINIC | Age: 39
End: 2024-12-06
Payer: MEDICARE

## 2024-12-06 VITALS
HEART RATE: 80 BPM | TEMPERATURE: 98.4 F | BODY MASS INDEX: 28.63 KG/M2 | SYSTOLIC BLOOD PRESSURE: 139 MMHG | HEIGHT: 70 IN | DIASTOLIC BLOOD PRESSURE: 84 MMHG | WEIGHT: 200 LBS

## 2024-12-06 DIAGNOSIS — R10.11 RIGHT UPPER QUADRANT ABDOMINAL PAIN: ICD-10-CM

## 2024-12-06 DIAGNOSIS — R10.33 PERIUMBILICAL ABDOMINAL PAIN: Primary | ICD-10-CM

## 2024-12-06 RX ORDER — BUPROPION HYDROCHLORIDE 150 MG/1
TABLET ORAL
COMMUNITY
Start: 2024-12-05

## 2024-12-06 NOTE — PROGRESS NOTES
"Chief Complaint  Abdominal Pain    Subjective          History of Present Illness    De Chau is a  39 y.o. male presents for evaluation of abdominal pain.  He is a patient of Dr. Guillermo last seen in 2022 for transient right upper quadrant pain.  He is new to me.    Today he reports RUQ pain and umbilical pain ongoing for years. He had lap cholecystectomy for transient RUQ pain with Dr. Jaramillo on 12/28/2022 but RUQ pain persisted after that: He reports sharp RUQ pain intermittently. Worse with lifting heavy, crunches, or leg exercises. Also feels this when he is hungry. Lasts for couple minutes about twice a day.     He had umbilical hernia repair at the same time and reports periumbilical pain started after that. He reports umbilical discomfort with a \"tugging\" sensation to the right. Tugging sensation is worse with exercise. Has to rest when it happens. Happens with intense work outs. No change with PO intake.     Has loose stools intermittently but not related to pains above.  Mostly normal stools.  Denies melena, hematochezia, reflux, dyspepsia, constipation, abnormal weight loss.    History of emergency appendectomy age 9 but did not rupture.  Cholecystectomy and periumbilical hernia repair.    Objective   Vital Signs:   /84   Pulse 80   Temp 98.4 °F (36.9 °C)   Ht 177.8 cm (70\")   Wt 90.7 kg (200 lb)   BMI 28.70 kg/m²       Physical Exam  Vitals reviewed.   Constitutional:       General: He is awake. He is not in acute distress.     Appearance: Normal appearance. He is well-developed and well-groomed.   HENT:      Head: Normocephalic.   Pulmonary:      Effort: Pulmonary effort is normal. No respiratory distress.   Abdominal:      General: Abdomen is flat. There is no distension.      Palpations: Abdomen is soft. There is no hepatomegaly or mass.      Tenderness: There is abdominal tenderness in the periumbilical area. There is no guarding or rebound.      Hernia: No hernia is present.      " Comments: Periumbilical TTP and extending down his vertical midline appendectomy scar as well as to the right of the umbilicus.    I did not palpate any hernias however he does have asymmetry with slight prominence on the right of the umbilicus compared to the left.   Skin:     Coloration: Skin is not pale.   Neurological:      Mental Status: He is alert and oriented to person, place, and time.      Gait: Gait is intact.   Psychiatric:         Mood and Affect: Mood and affect normal.         Speech: Speech normal.         Behavior: Behavior is cooperative.         Judgment: Judgment normal.          Result Review :             Assessment and Plan    Diagnoses and all orders for this visit:    1. Periumbilical abdominal pain (Primary)  -     CT Abdomen Pelvis With Contrast    2. Right upper quadrant abdominal pain    Other orders  -     hyoscyamine (LEVSIN) 0.125 MG SL tablet; Take 1 tablet by mouth Every 4 (Four) Hours As Needed for Cramping.  Dispense: 10 tablet; Refill: 0    Recommend proceeding with CT scan for the periumbilical pain with tugging sensation to the right.  He does have some asymmetry in his abdomen on the right side compared to left of I did not feel a hernia.  Possibly symptoms are related to adhesions which we did discuss.  They seem atypical for intestinal disorder and no evidence of blockage.    Also with right upper quadrant pain which is separate ongoing since before his gallbladder was removed.  This pain is transient only lasting a few minutes, worse with working out.  Does not sound GI related which we did discuss.  Possibly musculoskeletal source.    Gave him hyoscyamine to try, particularly for the periumbilical discomfort to see if intestinal spasms may be the source.  He will try these prior to working out as this seems to be when his pain flares.    Follow Up   Return for Follow-up based on test results.    Dragon dictation used throughout this note.     Genny Mccormick PA-C

## 2024-12-23 ENCOUNTER — HOSPITAL ENCOUNTER (OUTPATIENT)
Dept: CT IMAGING | Facility: HOSPITAL | Age: 39
Discharge: HOME | End: 2024-12-23
Admitting: PHYSICIAN ASSISTANT
Payer: MEDICARE

## 2024-12-23 PROCEDURE — 25510000002 DIATRIZOATE MEGLUMINE & SODIUM PER 1 ML: Performed by: PHYSICIAN ASSISTANT

## 2024-12-23 PROCEDURE — 25510000001 IOPAMIDOL 61 % SOLUTION: Performed by: PHYSICIAN ASSISTANT

## 2024-12-23 PROCEDURE — 74177 CT ABD & PELVIS W/CONTRAST: CPT

## 2024-12-23 RX ORDER — DIATRIZOATE MEGLUMINE AND DIATRIZOATE SODIUM 660; 100 MG/ML; MG/ML
30 SOLUTION ORAL; RECTAL
Status: COMPLETED | OUTPATIENT
Start: 2024-12-23 | End: 2024-12-23

## 2024-12-23 RX ORDER — IOPAMIDOL 612 MG/ML
100 INJECTION, SOLUTION INTRAVASCULAR
Status: COMPLETED | OUTPATIENT
Start: 2024-12-23 | End: 2024-12-23

## 2024-12-23 RX ADMIN — DIATRIZOATE MEGLUMINE AND DIATRIZOATE SODIUM 30 ML: 660; 100 LIQUID ORAL; RECTAL at 12:50

## 2024-12-23 RX ADMIN — IOPAMIDOL 85 ML: 612 INJECTION, SOLUTION INTRAVENOUS at 12:51

## 2025-01-17 ENCOUNTER — OFFICE VISIT (OUTPATIENT)
Dept: SURGERY | Facility: CLINIC | Age: 40
End: 2025-01-17
Payer: MEDICARE

## 2025-01-17 VITALS
DIASTOLIC BLOOD PRESSURE: 84 MMHG | BODY MASS INDEX: 28.66 KG/M2 | HEIGHT: 70 IN | HEART RATE: 55 BPM | WEIGHT: 200.2 LBS | OXYGEN SATURATION: 98 % | SYSTOLIC BLOOD PRESSURE: 142 MMHG

## 2025-01-17 DIAGNOSIS — K42.9 UMBILICAL HERNIA WITHOUT OBSTRUCTION AND WITHOUT GANGRENE: Primary | ICD-10-CM

## 2025-01-17 PROCEDURE — 1159F MED LIST DOCD IN RCRD: CPT | Performed by: SURGERY

## 2025-01-17 PROCEDURE — 99214 OFFICE O/P EST MOD 30 MIN: CPT | Performed by: SURGERY

## 2025-01-17 PROCEDURE — 1160F RVW MEDS BY RX/DR IN RCRD: CPT | Performed by: SURGERY

## 2025-01-17 RX ORDER — PROPRANOLOL HCL 20 MG
20 TABLET ORAL 2 TIMES DAILY PRN
COMMUNITY
Start: 2024-12-04

## 2025-01-17 RX ORDER — SODIUM CHLORIDE 9 MG/ML
40 INJECTION, SOLUTION INTRAVENOUS AS NEEDED
OUTPATIENT
Start: 2025-01-17

## 2025-01-17 RX ORDER — ONDANSETRON 2 MG/ML
4 INJECTION INTRAMUSCULAR; INTRAVENOUS EVERY 6 HOURS PRN
OUTPATIENT
Start: 2025-01-17

## 2025-01-17 RX ORDER — SODIUM CHLORIDE 0.9 % (FLUSH) 0.9 %
10 SYRINGE (ML) INJECTION EVERY 12 HOURS SCHEDULED
OUTPATIENT
Start: 2025-01-17

## 2025-01-17 RX ORDER — SODIUM CHLORIDE 0.9 % (FLUSH) 0.9 %
10 SYRINGE (ML) INJECTION AS NEEDED
OUTPATIENT
Start: 2025-01-17

## 2025-01-18 NOTE — PROGRESS NOTES
Date: 2025   Patient Name: De Chau   Medical Record #: [id number]   : 1985  Age: 39 y.o.  Sex: male      Referring MD:  STEPHON Kennedy    Reason for Visit  Chief Complaint   Patient presents with    Abdominal Pain        History of Present Illness:     De Chau is a 39 y.o. male who presents with a painful abdominal mass at the level of the umbilicus.  The mass is not reducible.  It is often tender.   Heavy physical activity tends to make the pain more severe. The pain lasts for a variable period of time. The pain is  almost always centered in the area of the buldge, but can occasionally radiate.  The quality can be sharp or burning in nature. The patient has not had obstructive symptoms.  He has history of laparoscopic cholecystectomy on 2022    Past Medical History    Patient Active Problem List   Diagnosis    Umbilical hernia without obstruction and without gangrene         Past Surgical History    Past Surgical History:   Procedure Laterality Date    APPENDECTOMY N/A     CHOLECYSTECTOMY WITH INTRAOPERATIVE CHOLANGIOGRAM N/A 2022    Procedure: CHOLECYSTECTOMY LAPAROSCOPIC INTRAOPERATIVE CHOLANGIOGRAM;  Surgeon: Vince Jaramillo MD;  Location: [facility];  Service: General;  Laterality: N/A;       Allergies    No Known Allergies    Medications  Current Outpatient Medications   Medication Sig Dispense Refill    pravastatin (PRAVACHOL) 20 MG tablet Take 1 tablet by mouth Daily.      propranolol (INDERAL) 20 MG tablet Take 1 tablet by mouth 2 (Two) Times a Day As Needed.      VITAMIN D PO Take 1 tablet by mouth Daily.       No current facility-administered medications for this visit.         Social History  Social History     Socioeconomic History    Marital status: Significant Other   Tobacco Use    Smoking status: Never    Smokeless tobacco: Never   Vaping Use    Vaping status: Never Used   Substance and Sexual Activity    Alcohol use: Yes  "    Alcohol/week: 3.0 standard drinks of alcohol     Types: 3 Shots of liquor per week    Drug use: Yes     Types: Marijuana     Comment: occasional    Sexual activity: Yes     Partners: Female       Family History  Family History   Problem Relation Age of Onset    Colon polyps Mother     Kidney cancer Mother     Alcohol abuse Mother     Cancer Mother     Alcohol abuse Maternal Aunt     Malig Hyperthermia Neg Hx        REVIEW OF SYSTEMS    As per HPI    Physical Examination  /84   Pulse 55   Ht 177.8 cm (70\")   Wt 90.8 kg (200 lb 3.2 oz)   SpO2 98%   BMI 28.73 kg/m²   Body mass index is 28.73 kg/m².  GENERAL:alert, well appearing, and in no distress  HEENT: normochephalic, atraumatic  CHEST: Breathing comfortably  CARDIAC: regular rate and rhythm    ABDOMEN: Abdomen soft, nontender nondistended.  There is a small and easily reducible umbilical hernia.  Medical Decision Making  Test Results:  CT scan abdomen pelvis that was reviewed by me that was done on 12/23/2024 show evidence of a umbilical hernia containing preperitoneal fat as well as small supraumbilical hernia containing preperitoneal fat.  Evidence of prior cholecystectomy.    Impression:  This is a 39 y.o. male patient with a chief complaint of an umbilical hernia.   Risks and benefits of conservative, laparoscopic, an open approach have been discussed in length and at detail with the patient. Discussed possibility of incarceration, strangulation, enlargement in size over time, and the risk of emergency surgery in the face of strangulation. After carefully considering those risks and benefits, being given an opportunity to further ask questions, and voicing understanding,  the patient has chosen to undergo an open umbilical hernia repair with mesh placement.    Plan:  1.  Open umbilical hernia repair with mesh placement.    All questions were answered and the patient was willing to proceed with the above recommendations.    Orders:   Orders " Placed This Encounter   Procedures    Provide Patient With Instructions on NPO Status     Standing Status:   Future    Provide Chlorhexidine Skin Prep Wipes and Instructions     Chlorhexidine Skin Prep and Instructions For All Patients Having a Procedure Requiring and Outward Incision if Not Allergic. If Allergic, Give Antibacterial Skin Wipes and Instructions. Do Not Use for Facial Cases or on Any Mucous Membranes.     Standing Status:   Future    ECG 12 Lead     Standing Status:   Future     Standing Expiration Date:   1/17/2026     Order Specific Question:   Reason for Exam:     Answer:   preop     Order Specific Question:   Release to patient     Answer:   Routine Release [[id number]]         Vince Jaramillo MD  General, Minimally Invasive and Endoscopic Surgery  [facility]    [address], [address]  [place], [place], [address]  P: [phone]  F: [phone]

## 2025-01-18 NOTE — H&P (VIEW-ONLY)
Date: 2025   Patient Name: De Chau   Medical Record #: [id number]   : 1985  Age: 39 y.o.  Sex: male      Referring MD:  STEPHON Kennedy    Reason for Visit  Chief Complaint   Patient presents with    Abdominal Pain        History of Present Illness:     De Chau is a 39 y.o. male who presents with a painful abdominal mass at the level of the umbilicus.  The mass is not reducible.  It is often tender.   Heavy physical activity tends to make the pain more severe. The pain lasts for a variable period of time. The pain is  almost always centered in the area of the buldge, but can occasionally radiate.  The quality can be sharp or burning in nature. The patient has not had obstructive symptoms.  He has history of laparoscopic cholecystectomy on 2022    Past Medical History    Patient Active Problem List   Diagnosis    Umbilical hernia without obstruction and without gangrene         Past Surgical History    Past Surgical History:   Procedure Laterality Date    APPENDECTOMY N/A     CHOLECYSTECTOMY WITH INTRAOPERATIVE CHOLANGIOGRAM N/A 2022    Procedure: CHOLECYSTECTOMY LAPAROSCOPIC INTRAOPERATIVE CHOLANGIOGRAM;  Surgeon: Vince Jaramillo MD;  Location: [facility];  Service: General;  Laterality: N/A;       Allergies    No Known Allergies    Medications  Current Outpatient Medications   Medication Sig Dispense Refill    pravastatin (PRAVACHOL) 20 MG tablet Take 1 tablet by mouth Daily.      propranolol (INDERAL) 20 MG tablet Take 1 tablet by mouth 2 (Two) Times a Day As Needed.      VITAMIN D PO Take 1 tablet by mouth Daily.       No current facility-administered medications for this visit.         Social History  Social History     Socioeconomic History    Marital status: Significant Other   Tobacco Use    Smoking status: Never    Smokeless tobacco: Never   Vaping Use    Vaping status: Never Used   Substance and Sexual Activity    Alcohol use: Yes  "    Alcohol/week: 3.0 standard drinks of alcohol     Types: 3 Shots of liquor per week    Drug use: Yes     Types: Marijuana     Comment: occasional    Sexual activity: Yes     Partners: Female       Family History  Family History   Problem Relation Age of Onset    Colon polyps Mother     Kidney cancer Mother     Alcohol abuse Mother     Cancer Mother     Alcohol abuse Maternal Aunt     Malig Hyperthermia Neg Hx        REVIEW OF SYSTEMS    As per HPI    Physical Examination  /84   Pulse 55   Ht 177.8 cm (70\")   Wt 90.8 kg (200 lb 3.2 oz)   SpO2 98%   BMI 28.73 kg/m²   Body mass index is 28.73 kg/m².  GENERAL:alert, well appearing, and in no distress  HEENT: normochephalic, atraumatic  CHEST: Breathing comfortably  CARDIAC: regular rate and rhythm    ABDOMEN: Abdomen soft, nontender nondistended.  There is a small and easily reducible umbilical hernia.  Medical Decision Making  Test Results:  CT scan abdomen pelvis that was reviewed by me that was done on 12/23/2024 show evidence of a umbilical hernia containing preperitoneal fat as well as small supraumbilical hernia containing preperitoneal fat.  Evidence of prior cholecystectomy.    Impression:  This is a 39 y.o. male patient with a chief complaint of an umbilical hernia.   Risks and benefits of conservative, laparoscopic, an open approach have been discussed in length and at detail with the patient. Discussed possibility of incarceration, strangulation, enlargement in size over time, and the risk of emergency surgery in the face of strangulation. After carefully considering those risks and benefits, being given an opportunity to further ask questions, and voicing understanding,  the patient has chosen to undergo an open umbilical hernia repair with mesh placement.    Plan:  1.  Open umbilical hernia repair with mesh placement.    All questions were answered and the patient was willing to proceed with the above recommendations.    Orders:   Orders " Placed This Encounter   Procedures    Provide Patient With Instructions on NPO Status     Standing Status:   Future    Provide Chlorhexidine Skin Prep Wipes and Instructions     Chlorhexidine Skin Prep and Instructions For All Patients Having a Procedure Requiring and Outward Incision if Not Allergic. If Allergic, Give Antibacterial Skin Wipes and Instructions. Do Not Use for Facial Cases or on Any Mucous Membranes.     Standing Status:   Future    ECG 12 Lead     Standing Status:   Future     Standing Expiration Date:   1/17/2026     Order Specific Question:   Reason for Exam:     Answer:   preop     Order Specific Question:   Release to patient     Answer:   Routine Release [[id number]]         Vince Jaramillo MD  General, Minimally Invasive and Endoscopic Surgery  [facility]    [address], [address]  [place], [place], [address]  P: [phone]  F: [phone]

## 2025-01-28 ENCOUNTER — PRE-ADMISSION TESTING (OUTPATIENT)
Dept: PREADMISSION TESTING | Facility: HOSPITAL | Age: 40
End: 2025-01-28
Payer: MEDICARE

## 2025-01-28 VITALS
SYSTOLIC BLOOD PRESSURE: 140 MMHG | BODY MASS INDEX: 29.06 KG/M2 | HEIGHT: 69 IN | TEMPERATURE: 99.1 F | DIASTOLIC BLOOD PRESSURE: 90 MMHG | OXYGEN SATURATION: 100 % | WEIGHT: 196.2 LBS | HEART RATE: 73 BPM | RESPIRATION RATE: 18 BRPM

## 2025-01-28 DIAGNOSIS — K42.9 UMBILICAL HERNIA WITHOUT OBSTRUCTION AND WITHOUT GANGRENE: ICD-10-CM

## 2025-01-28 LAB
ANION GAP SERPL CALCULATED.3IONS-SCNC: 11.7 MMOL/L (ref 5–15)
BUN SERPL-MCNC: 10 MG/DL (ref 6–20)
BUN/CREAT SERPL: 8.9 (ref 7–25)
CALCIUM SPEC-SCNC: 9.5 MG/DL (ref 8.6–10.5)
CHLORIDE SERPL-SCNC: 103 MMOL/L (ref 98–107)
CO2 SERPL-SCNC: 25.3 MMOL/L (ref 22–29)
CREAT SERPL-MCNC: 1.12 MG/DL (ref 0.76–1.27)
DEPRECATED RDW RBC AUTO: 40.5 FL (ref 37–54)
EGFRCR SERPLBLD CKD-EPI 2021: 85.2 ML/MIN/1.73
ERYTHROCYTE [DISTWIDTH] IN BLOOD BY AUTOMATED COUNT: 12.7 % (ref 12.3–15.4)
GLUCOSE SERPL-MCNC: 108 MG/DL (ref 65–99)
HCT VFR BLD AUTO: 44.4 % (ref 37.5–51)
HGB BLD-MCNC: 14.7 G/DL (ref 13–17.7)
MCH RBC QN AUTO: 28.9 PG (ref 26.6–33)
MCHC RBC AUTO-ENTMCNC: 33.1 G/DL (ref 31.5–35.7)
MCV RBC AUTO: 87.4 FL (ref 79–97)
PLATELET # BLD AUTO: 425 10*3/MM3 (ref 140–450)
PMV BLD AUTO: 8.8 FL (ref 6–12)
POTASSIUM SERPL-SCNC: 4.4 MMOL/L (ref 3.5–5.2)
QT INTERVAL: 360 MS
QTC INTERVAL: 394 MS
RBC # BLD AUTO: 5.08 10*6/MM3 (ref 4.14–5.8)
SODIUM SERPL-SCNC: 140 MMOL/L (ref 136–145)
WBC NRBC COR # BLD AUTO: 5.91 10*3/MM3 (ref 3.4–10.8)

## 2025-01-28 PROCEDURE — 36415 COLL VENOUS BLD VENIPUNCTURE: CPT

## 2025-01-28 PROCEDURE — 93005 ELECTROCARDIOGRAM TRACING: CPT

## 2025-01-28 PROCEDURE — 80048 BASIC METABOLIC PNL TOTAL CA: CPT

## 2025-01-28 PROCEDURE — 85027 COMPLETE CBC AUTOMATED: CPT

## 2025-01-28 NOTE — DISCHARGE INSTRUCTIONS
Take the following medications the morning of surgery:  PROPANOLOL (IF NEEDED)    If you are on prescription narcotic pain medication to control your pain you may also take that medication the morning of surgery.      General Instructions:     Do not eat solid food after midnight the night before surgery.  Clear liquids day of surgery are allowed but must be stopped at least two hours before your hospital arrival time.       Allowed clear liquids      Water, sodas, and tea or coffee with no cream or milk added.       12 to 20 ounces of a clear liquid that contains carbohydrates is recommended.  If non-diabetic, have Gatorade or Powerade.  If diabetic, have G2 or Powerade Zero.     Do not have liquids red in color.  Do not consume chicken, beef, pork or vegetable broth or bouillon cubes of any variety as they are not considered clear liquids and are not allowed.      Infants may have breast milk up to four hours before surgery.  Infants drinking formula may drink formula up to six hours before surgery.   Patients who avoid smoking, chewing tobacco and alcohol for 4 weeks prior to surgery have a reduced risk of post-operative complications.  Quit smoking as many days before surgery as you can.  Do not smoke, use chewing tobacco or drink alcohol the day of surgery.   If applicable bring your C-PAP/ BI-PAP machine in with you to preop day of surgery.  Bring any papers given to you in the doctor’s office.  Wear clean comfortable clothes.  Do not wear contact lenses, false eyelashes or make-up.  Bring a case for your glasses.   Bring crutches or walker if applicable.  Remove all piercings.  Leave jewelry and any other valuables at home.  Hair extensions with metal clips must be removed prior to surgery.  The Pre-Admission Testing nurse will instruct you to bring medications if unable to obtain an accurate list in Pre-Admission Testing.        If you were given a blood bank ID arm band remember to bring it with you the day  of surgery.    Preventing a Surgical Site Infection:  For 2 to 3 days before surgery, avoid shaving with a razor because the razor can irritate skin and make it easier to develop an infection.    Any areas of open skin can increase the risk of a post-operative wound infection by allowing bacteria to enter and travel throughout the body.  Notify your surgeon if you have any skin wounds / rashes even if it is not near the expected surgical site.  The area will need assessed to determine if surgery should be delayed until it is healed.  The night prior to surgery shower using a fresh bar of anti-bacterial soap (such as Dial) and clean washcloth.  Sleep in a clean bed with clean clothing.  Do not allow pets to sleep with you.  Shower on the morning of surgery using a fresh bar of anti-bacterial soap (such as Dial) and clean washcloth.  Dry with a clean towel and dress in clean clothing.  Ask your surgeon if you will be receiving antibiotics prior to surgery.  Make sure you, your family, and all healthcare providers clean their hands with soap and water or an alcohol based hand  before caring for you or your wound.      CHLORHEXIDINE CLOTH INSTRUCTIONS  The morning of surgery follow these instructions using the Chlorhexidine cloths you've been given.  These steps reduce bacteria on the body.  Do not use the cloths near your eyes, ears mouth, genitalia or on open wounds.  Throw the cloths away after use but do not try to flush them down a toilet.      Open and remove one cloth at a time from the package.    Leave the cloth unfolded and begin the bathing.  Massage the skin with the cloths using gentle pressure to remove bacteria.  Do not scrub harshly.   Follow the steps below with one 2% CHG cloth per area (6 total cloths).  One cloth for neck, shoulders and chest.  One cloth for both arms, hands, fingers and underarms (do underarms last).  One cloth for the abdomen followed by groin.  One cloth for right leg and  foot including between the toes.  One cloth for left leg and foot including between the toes.  The last cloth is to be used for the back of the neck, back and buttocks.    Allow the CHG to air dry 3 minutes on the skin which will give it time to work and decrease the chance of irritation.  The skin may feel sticky until it is dry.  Do not rinse with water or any other liquid or you will lose the beneficial effects of the CHG.  If mild skin irritation occurs, do rinse the skin to remove the CHG.  Report this to the nurse at time of admission.  Do not apply lotions, creams, ointments, deodorants or perfumes after using the clothes. Dress in clean clothes before coming to the hospital.      Day of surgery:  Your arrival time is approximately two hours before your scheduled surgery time.  Please note if you have an early arrival time the surgery doors do not open before 5:00 AM.  Upon arrival, a Pre-op nurse and Anesthesiologist will review your health history, obtain vital signs, and answer questions you may have.  The only belongings needed at this time will be a list of your home medications and if applicable your C-PAP/BI-PAP machine.  A Pre-op nurse will start an IV and you may receive medication in preparation for surgery, including something to help you relax.     Please be aware that surgery does come with discomfort.  We want to make every effort to control your discomfort so please discuss any uncontrolled symptoms with your nurse.   Your doctor will most likely have prescribed pain medications.      If you are going home after surgery you will receive individualized written care instructions before being discharged.  A responsible adult must drive you to and from the hospital on the day of your surgery and ideally stay with you through the night.   .  Discharge prescriptions can be filled by the hospital pharmacy during regular pharmacy hours.  If you are having surgery late in the day/evening your  prescription may be e-prescribed to your pharmacy.  Please verify your pharmacy hours or chose a 24 hour pharmacy to avoid not having access to your prescription because your pharmacy has closed for the day.    If you are staying overnight following surgery, you will be transported to your hospital room following the recovery period.  [facility] has all private rooms.    If you have any questions please call Pre-Admission Testing at [phone].  Deductibles and co-payments are collected on the day of service. Please be prepared to pay the required co-pay, deductible or deposit on the day of service as defined by your plan.    Call your surgeon immediately if you experience any of the following symptoms:  Sore Throat  Shortness of Breath or difficulty breathing  Cough  Chills  Body soreness or muscle pain  Headache  Fever  New loss of taste or smell  Do not arrive for your surgery ill.  Your procedure will need to be rescheduled to another time.  You will need to call your physician before the day of surgery to avoid any unnecessary exposure to hospital staff as well as other patients.

## 2025-02-03 ENCOUNTER — ANESTHESIA EVENT (OUTPATIENT)
Dept: PERIOP | Facility: HOSPITAL | Age: 40
End: 2025-02-03
Payer: MEDICARE

## 2025-02-03 ENCOUNTER — ANESTHESIA (OUTPATIENT)
Dept: PERIOP | Facility: HOSPITAL | Age: 40
End: 2025-02-03
Payer: MEDICARE

## 2025-02-03 ENCOUNTER — HOSPITAL ENCOUNTER (OUTPATIENT)
Facility: HOSPITAL | Age: 40
Setting detail: HOSPITAL OUTPATIENT SURGERY
Discharge: HOME | End: 2025-02-03
Attending: SURGERY | Admitting: SURGERY
Payer: MEDICARE

## 2025-02-03 VITALS
DIASTOLIC BLOOD PRESSURE: 97 MMHG | HEART RATE: 65 BPM | RESPIRATION RATE: 16 BRPM | TEMPERATURE: 98.5 F | OXYGEN SATURATION: 99 % | SYSTOLIC BLOOD PRESSURE: 131 MMHG

## 2025-02-03 DIAGNOSIS — K42.9 UMBILICAL HERNIA WITHOUT OBSTRUCTION AND WITHOUT GANGRENE: ICD-10-CM

## 2025-02-03 PROCEDURE — 25010000002 KETOROLAC TROMETHAMINE PER 15 MG

## 2025-02-03 PROCEDURE — 25010000002 FENTANYL CITRATE (PF) 50 MCG/ML SOLUTION

## 2025-02-03 PROCEDURE — 25010000002 DIPHENHYDRAMINE PER 50 MG

## 2025-02-03 PROCEDURE — 25010000002 HYDROMORPHONE 1 MG/ML SOLUTION

## 2025-02-03 PROCEDURE — 25010000002 MIDAZOLAM PER 1 MG: Performed by: ANESTHESIOLOGY

## 2025-02-03 PROCEDURE — 25010000002 DEXAMETHASONE PER 1 MG

## 2025-02-03 PROCEDURE — C1781 MESH (IMPLANTABLE): HCPCS | Performed by: SURGERY

## 2025-02-03 PROCEDURE — 25810000003 LACTATED RINGERS PER 1000 ML: Performed by: ANESTHESIOLOGY

## 2025-02-03 PROCEDURE — 25010000002 LIDOCAINE 2% SOLUTION

## 2025-02-03 PROCEDURE — 25010000002 ONDANSETRON PER 1 MG

## 2025-02-03 PROCEDURE — 49615 RPR AA HRN RCR 3-10 RDC: CPT | Performed by: SURGERY

## 2025-02-03 PROCEDURE — 25010000002 GLYCOPYRROLATE 0.2 MG/ML SOLUTION

## 2025-02-03 PROCEDURE — 25010000002 CEFAZOLIN PER 500 MG: Performed by: SURGERY

## 2025-02-03 PROCEDURE — 25010000002 PROPOFOL 200 MG/20ML EMULSION

## 2025-02-03 PROCEDURE — 25010000002 HYDROMORPHONE PER 4 MG

## 2025-02-03 PROCEDURE — 25010000002 SUGAMMADEX 200 MG/2ML SOLUTION

## 2025-02-03 DEVICE — VENTRALEX ST HERNIA PATCH, 8.0 CM (3.2"), CIRCLE
Type: IMPLANTABLE DEVICE | Site: ABDOMEN | Status: FUNCTIONAL
Brand: VENTRALEX

## 2025-02-03 RX ORDER — SODIUM CHLORIDE 0.9 % (FLUSH) 0.9 %
10 SYRINGE (ML) INJECTION AS NEEDED
Status: DISCONTINUED | OUTPATIENT
Start: 2025-02-03 | End: 2025-02-03 | Stop reason: HOSPADM

## 2025-02-03 RX ORDER — FENTANYL CITRATE 50 UG/ML
50 INJECTION, SOLUTION INTRAMUSCULAR; INTRAVENOUS
Status: DISCONTINUED | OUTPATIENT
Start: 2025-02-03 | End: 2025-02-03 | Stop reason: HOSPADM

## 2025-02-03 RX ORDER — SODIUM CHLORIDE 0.9 % (FLUSH) 0.9 %
3 SYRINGE (ML) INJECTION EVERY 12 HOURS SCHEDULED
Status: DISCONTINUED | OUTPATIENT
Start: 2025-02-03 | End: 2025-02-03 | Stop reason: HOSPADM

## 2025-02-03 RX ORDER — PROMETHAZINE HYDROCHLORIDE 25 MG/1
12.5 TABLET ORAL ONCE AS NEEDED
Status: DISCONTINUED | OUTPATIENT
Start: 2025-02-03 | End: 2025-02-03 | Stop reason: HOSPADM

## 2025-02-03 RX ORDER — DOCUSATE SODIUM 100 MG/1
100 CAPSULE, LIQUID FILLED ORAL 2 TIMES DAILY PRN
Status: DISCONTINUED | OUTPATIENT
Start: 2025-02-03 | End: 2025-02-03 | Stop reason: HOSPADM

## 2025-02-03 RX ORDER — ONDANSETRON 2 MG/ML
4 INJECTION INTRAMUSCULAR; INTRAVENOUS EVERY 6 HOURS PRN
Status: DISCONTINUED | OUTPATIENT
Start: 2025-02-03 | End: 2025-02-03 | Stop reason: HOSPADM

## 2025-02-03 RX ORDER — ATROPINE SULFATE 0.4 MG/ML
0.4 INJECTION, SOLUTION INTRAMUSCULAR; INTRAVENOUS; SUBCUTANEOUS ONCE AS NEEDED
Status: DISCONTINUED | OUTPATIENT
Start: 2025-02-03 | End: 2025-02-03 | Stop reason: HOSPADM

## 2025-02-03 RX ORDER — EPHEDRINE SULFATE 50 MG/ML
5 INJECTION, SOLUTION INTRAVENOUS ONCE AS NEEDED
Status: DISCONTINUED | OUTPATIENT
Start: 2025-02-03 | End: 2025-02-03 | Stop reason: HOSPADM

## 2025-02-03 RX ORDER — OXYCODONE AND ACETAMINOPHEN 7.5; 325 MG/1; MG/1
1 TABLET ORAL EVERY 4 HOURS PRN
Status: DISCONTINUED | OUTPATIENT
Start: 2025-02-03 | End: 2025-02-03 | Stop reason: HOSPADM

## 2025-02-03 RX ORDER — AMOXICILLIN 250 MG
2 CAPSULE ORAL DAILY PRN
Qty: 30 TABLET | Refills: 1 | Status: SHIPPED | OUTPATIENT
Start: 2025-02-03 | End: 2026-02-03

## 2025-02-03 RX ORDER — LIDOCAINE HYDROCHLORIDE 20 MG/ML
INJECTION, SOLUTION INFILTRATION; PERINEURAL AS NEEDED
Status: DISCONTINUED | OUTPATIENT
Start: 2025-02-03 | End: 2025-02-03 | Stop reason: SURG

## 2025-02-03 RX ORDER — OXYCODONE AND ACETAMINOPHEN 5; 325 MG/1; MG/1
1 TABLET ORAL ONCE AS NEEDED
Status: DISCONTINUED | OUTPATIENT
Start: 2025-02-03 | End: 2025-02-03 | Stop reason: HOSPADM

## 2025-02-03 RX ORDER — SODIUM CHLORIDE 0.9 % (FLUSH) 0.9 %
3-10 SYRINGE (ML) INJECTION AS NEEDED
Status: DISCONTINUED | OUTPATIENT
Start: 2025-02-03 | End: 2025-02-03 | Stop reason: HOSPADM

## 2025-02-03 RX ORDER — ONDANSETRON 4 MG/1
4 TABLET, FILM COATED ORAL EVERY 8 HOURS PRN
Qty: 10 TABLET | Refills: 0 | Status: SHIPPED | OUTPATIENT
Start: 2025-02-03 | End: 2026-02-03

## 2025-02-03 RX ORDER — DEXAMETHASONE SODIUM PHOSPHATE 4 MG/ML
INJECTION, SOLUTION INTRA-ARTICULAR; INTRALESIONAL; INTRAMUSCULAR; INTRAVENOUS; SOFT TISSUE AS NEEDED
Status: DISCONTINUED | OUTPATIENT
Start: 2025-02-03 | End: 2025-02-03 | Stop reason: SURG

## 2025-02-03 RX ORDER — HYDROMORPHONE HYDROCHLORIDE 1 MG/ML
0.5 INJECTION, SOLUTION INTRAMUSCULAR; INTRAVENOUS; SUBCUTANEOUS
Status: DISCONTINUED | OUTPATIENT
Start: 2025-02-03 | End: 2025-02-03 | Stop reason: HOSPADM

## 2025-02-03 RX ORDER — NALOXONE HCL 0.4 MG/ML
0.2 VIAL (ML) INJECTION AS NEEDED
Status: DISCONTINUED | OUTPATIENT
Start: 2025-02-03 | End: 2025-02-03 | Stop reason: HOSPADM

## 2025-02-03 RX ORDER — ONDANSETRON 2 MG/ML
INJECTION INTRAMUSCULAR; INTRAVENOUS AS NEEDED
Status: DISCONTINUED | OUTPATIENT
Start: 2025-02-03 | End: 2025-02-03 | Stop reason: SURG

## 2025-02-03 RX ORDER — ACETAMINOPHEN 500 MG
1000 TABLET ORAL ONCE
Status: COMPLETED | OUTPATIENT
Start: 2025-02-03 | End: 2025-02-03

## 2025-02-03 RX ORDER — LIDOCAINE HYDROCHLORIDE 10 MG/ML
0.5 INJECTION, SOLUTION INFILTRATION; PERINEURAL ONCE AS NEEDED
Status: DISCONTINUED | OUTPATIENT
Start: 2025-02-03 | End: 2025-02-03 | Stop reason: HOSPADM

## 2025-02-03 RX ORDER — FLUMAZENIL 0.1 MG/ML
0.2 INJECTION INTRAVENOUS AS NEEDED
Status: DISCONTINUED | OUTPATIENT
Start: 2025-02-03 | End: 2025-02-03 | Stop reason: HOSPADM

## 2025-02-03 RX ORDER — GLYCOPYRROLATE 0.2 MG/ML
INJECTION INTRAMUSCULAR; INTRAVENOUS AS NEEDED
Status: DISCONTINUED | OUTPATIENT
Start: 2025-02-03 | End: 2025-02-03 | Stop reason: SURG

## 2025-02-03 RX ORDER — IBUPROFEN 600 MG/1
600 TABLET, FILM COATED ORAL EVERY 8 HOURS
Qty: 15 TABLET | Refills: 0 | Status: SHIPPED | OUTPATIENT
Start: 2025-02-03 | End: 2025-02-08

## 2025-02-03 RX ORDER — MAGNESIUM HYDROXIDE 1200 MG/15ML
LIQUID ORAL AS NEEDED
Status: DISCONTINUED | OUTPATIENT
Start: 2025-02-03 | End: 2025-02-03 | Stop reason: HOSPADM

## 2025-02-03 RX ORDER — LABETALOL HYDROCHLORIDE 5 MG/ML
5 INJECTION, SOLUTION INTRAVENOUS
Status: DISCONTINUED | OUTPATIENT
Start: 2025-02-03 | End: 2025-02-03 | Stop reason: HOSPADM

## 2025-02-03 RX ORDER — FENTANYL CITRATE 50 UG/ML
INJECTION, SOLUTION INTRAMUSCULAR; INTRAVENOUS AS NEEDED
Status: DISCONTINUED | OUTPATIENT
Start: 2025-02-03 | End: 2025-02-03 | Stop reason: SURG

## 2025-02-03 RX ORDER — PROMETHAZINE HYDROCHLORIDE 25 MG/1
25 SUPPOSITORY RECTAL ONCE AS NEEDED
Status: DISCONTINUED | OUTPATIENT
Start: 2025-02-03 | End: 2025-02-03 | Stop reason: HOSPADM

## 2025-02-03 RX ORDER — PROPOFOL 10 MG/ML
INJECTION, EMULSION INTRAVENOUS AS NEEDED
Status: DISCONTINUED | OUTPATIENT
Start: 2025-02-03 | End: 2025-02-03 | Stop reason: SURG

## 2025-02-03 RX ORDER — PROMETHAZINE HYDROCHLORIDE 25 MG/1
25 TABLET ORAL ONCE AS NEEDED
Status: DISCONTINUED | OUTPATIENT
Start: 2025-02-03 | End: 2025-02-03 | Stop reason: HOSPADM

## 2025-02-03 RX ORDER — BUPIVACAINE HYDROCHLORIDE AND EPINEPHRINE 5; 5 MG/ML; UG/ML
INJECTION, SOLUTION EPIDURAL; INTRACAUDAL; PERINEURAL AS NEEDED
Status: DISCONTINUED | OUTPATIENT
Start: 2025-02-03 | End: 2025-02-03 | Stop reason: HOSPADM

## 2025-02-03 RX ORDER — HYDROCODONE BITARTRATE AND ACETAMINOPHEN 5; 325 MG/1; MG/1
1 TABLET ORAL ONCE AS NEEDED
Status: DISCONTINUED | OUTPATIENT
Start: 2025-02-03 | End: 2025-02-03 | Stop reason: HOSPADM

## 2025-02-03 RX ORDER — HYDRALAZINE HYDROCHLORIDE 20 MG/ML
5 INJECTION INTRAMUSCULAR; INTRAVENOUS
Status: DISCONTINUED | OUTPATIENT
Start: 2025-02-03 | End: 2025-02-03 | Stop reason: HOSPADM

## 2025-02-03 RX ORDER — SODIUM CHLORIDE 9 MG/ML
40 INJECTION, SOLUTION INTRAVENOUS AS NEEDED
Status: DISCONTINUED | OUTPATIENT
Start: 2025-02-03 | End: 2025-02-03 | Stop reason: HOSPADM

## 2025-02-03 RX ORDER — TRAMADOL HYDROCHLORIDE 50 MG/1
50 TABLET ORAL EVERY 12 HOURS PRN
Qty: 10 TABLET | Refills: 0 | Status: SHIPPED | OUTPATIENT
Start: 2025-02-03 | End: 2025-02-08

## 2025-02-03 RX ORDER — ONDANSETRON 2 MG/ML
4 INJECTION INTRAMUSCULAR; INTRAVENOUS ONCE AS NEEDED
Status: DISCONTINUED | OUTPATIENT
Start: 2025-02-03 | End: 2025-02-03 | Stop reason: HOSPADM

## 2025-02-03 RX ORDER — SODIUM CHLORIDE, SODIUM LACTATE, POTASSIUM CHLORIDE, CALCIUM CHLORIDE 600; 310; 30; 20 MG/100ML; MG/100ML; MG/100ML; MG/100ML
9 INJECTION, SOLUTION INTRAVENOUS CONTINUOUS
Status: DISCONTINUED | OUTPATIENT
Start: 2025-02-03 | End: 2025-02-03 | Stop reason: HOSPADM

## 2025-02-03 RX ORDER — MIDAZOLAM HYDROCHLORIDE 1 MG/ML
1 INJECTION, SOLUTION INTRAMUSCULAR; INTRAVENOUS
Status: DISCONTINUED | OUTPATIENT
Start: 2025-02-03 | End: 2025-02-03 | Stop reason: HOSPADM

## 2025-02-03 RX ORDER — ROCURONIUM BROMIDE 10 MG/ML
INJECTION, SOLUTION INTRAVENOUS AS NEEDED
Status: DISCONTINUED | OUTPATIENT
Start: 2025-02-03 | End: 2025-02-03 | Stop reason: SURG

## 2025-02-03 RX ORDER — DIPHENHYDRAMINE HYDROCHLORIDE 50 MG/ML
12.5 INJECTION INTRAMUSCULAR; INTRAVENOUS
Status: DISCONTINUED | OUTPATIENT
Start: 2025-02-03 | End: 2025-02-03 | Stop reason: HOSPADM

## 2025-02-03 RX ORDER — ACETAMINOPHEN 500 MG
1000 TABLET ORAL EVERY 8 HOURS
Qty: 30 TABLET | Refills: 0 | Status: SHIPPED | OUTPATIENT
Start: 2025-02-03 | End: 2025-02-08

## 2025-02-03 RX ORDER — SODIUM CHLORIDE 0.9 % (FLUSH) 0.9 %
10 SYRINGE (ML) INJECTION EVERY 12 HOURS SCHEDULED
Status: DISCONTINUED | OUTPATIENT
Start: 2025-02-03 | End: 2025-02-03 | Stop reason: HOSPADM

## 2025-02-03 RX ORDER — IPRATROPIUM BROMIDE AND ALBUTEROL SULFATE 2.5; .5 MG/3ML; MG/3ML
3 SOLUTION RESPIRATORY (INHALATION) ONCE AS NEEDED
Status: DISCONTINUED | OUTPATIENT
Start: 2025-02-03 | End: 2025-02-03 | Stop reason: HOSPADM

## 2025-02-03 RX ORDER — KETOROLAC TROMETHAMINE 30 MG/ML
INJECTION, SOLUTION INTRAMUSCULAR; INTRAVENOUS AS NEEDED
Status: DISCONTINUED | OUTPATIENT
Start: 2025-02-03 | End: 2025-02-03 | Stop reason: SURG

## 2025-02-03 RX ADMIN — SODIUM CHLORIDE, POTASSIUM CHLORIDE, SODIUM LACTATE AND CALCIUM CHLORIDE 9 ML/HR: 600; 310; 30; 20 INJECTION, SOLUTION INTRAVENOUS at 09:00

## 2025-02-03 RX ADMIN — FENTANYL CITRATE 50 MCG: 50 INJECTION, SOLUTION INTRAMUSCULAR; INTRAVENOUS at 11:22

## 2025-02-03 RX ADMIN — FENTANYL CITRATE 50 MCG: 50 INJECTION, SOLUTION INTRAMUSCULAR; INTRAVENOUS at 09:48

## 2025-02-03 RX ADMIN — ACETAMINOPHEN 1000 MG: 500 TABLET, FILM COATED ORAL at 09:00

## 2025-02-03 RX ADMIN — LIDOCAINE HYDROCHLORIDE 90 MG: 20 INJECTION, SOLUTION INFILTRATION; PERINEURAL at 09:52

## 2025-02-03 RX ADMIN — ONDANSETRON 4 MG: 2 INJECTION INTRAMUSCULAR; INTRAVENOUS at 10:02

## 2025-02-03 RX ADMIN — ROCURONIUM BROMIDE 50 MG: 10 INJECTION, SOLUTION INTRAVENOUS at 09:53

## 2025-02-03 RX ADMIN — DEXAMETHASONE SODIUM PHOSPHATE 4 MG: 4 INJECTION, SOLUTION INTRA-ARTICULAR; INTRALESIONAL; INTRAMUSCULAR; INTRAVENOUS; SOFT TISSUE at 10:02

## 2025-02-03 RX ADMIN — HYDROMORPHONE HYDROCHLORIDE 0.5 MG: 1 INJECTION, SOLUTION INTRAMUSCULAR; INTRAVENOUS; SUBCUTANEOUS at 11:32

## 2025-02-03 RX ADMIN — HYDROMORPHONE HYDROCHLORIDE 1 MG: 1 INJECTION, SOLUTION INTRAMUSCULAR; INTRAVENOUS; SUBCUTANEOUS at 10:05

## 2025-02-03 RX ADMIN — OXYCODONE HYDROCHLORIDE AND ACETAMINOPHEN 1 TABLET: 5; 325 TABLET ORAL at 11:54

## 2025-02-03 RX ADMIN — MIDAZOLAM 1 MG: 1 INJECTION INTRAMUSCULAR; INTRAVENOUS at 09:00

## 2025-02-03 RX ADMIN — SUGAMMADEX 200 MG: 100 INJECTION, SOLUTION INTRAVENOUS at 10:58

## 2025-02-03 RX ADMIN — PROPOFOL 200 MG: 10 INJECTION, EMULSION INTRAVENOUS at 09:52

## 2025-02-03 RX ADMIN — GLYCOPYRROLATE 0.2 MG: 0.2 INJECTION INTRAMUSCULAR; INTRAVENOUS at 10:02

## 2025-02-03 RX ADMIN — CEFAZOLIN 2000 MG: 2 INJECTION, POWDER, FOR SOLUTION INTRAMUSCULAR; INTRAVENOUS at 09:42

## 2025-02-03 RX ADMIN — PROPOFOL 100 MG: 10 INJECTION, EMULSION INTRAVENOUS at 09:57

## 2025-02-03 RX ADMIN — DIPHENHYDRAMINE HYDROCHLORIDE 12.5 MG: 50 INJECTION, SOLUTION INTRAMUSCULAR; INTRAVENOUS at 12:45

## 2025-02-03 RX ADMIN — FENTANYL CITRATE 50 MCG: 50 INJECTION, SOLUTION INTRAMUSCULAR; INTRAVENOUS at 10:12

## 2025-02-03 RX ADMIN — KETOROLAC TROMETHAMINE 30 MG: 30 INJECTION, SOLUTION INTRAMUSCULAR; INTRAVENOUS at 10:58

## 2025-02-03 NOTE — OP NOTE
PREOPERATIVE DIAGNOSIS: Recurrent umbilical Hernia  POSTOPERATIVE DIAGNOSIS: Recurrent  umbilical hernia  PROCEDURE:  Open recurrent umbilical hernia repair 3 x 3 cm with mesh  SURGEON/STAFF:  LIZZ Jaramillo  ASSISTANT: Jeni Dickens CSA dosing chart retraction, exposure, closing wounds and placing dressings that was essential for success of the case  ANESTHESIA:  General.   BLOOD LOSS: minimal  Implants:  8 cm round Ventralex ST: Hernia patch  COUNTS:  Needle and sponge count correct.  SPECIMENS: Preperitoneal fat    INDICATIONS FOR OPERATION:  De Chau is a 40 y.o. year old male who presented to my office with a symptomatic recurrent umbilical hernia.    On physical exam, he   was seen to have a partially reducible umbilical hernia.   The risks and benefits of open, conservative and laparoscopic management were discussed at length and in detail with the patient.  he has chosen to undergo open repair possible mesh placement if the defect is greater than 1 cm.     OPERATION:  The patient was brought to the operating room in stable condition.   Preoperative antibiotics were given and sequential compression devices were applied.  At this time, the patient was laid supine on the operating room table.  General anesthesia was induced by the Anesthesia service without difficulty.  The patient's abdomen was prepped and draped in the usual sterile fashion.      At this time, half percent Marcaine with epinephrine was injected in the infraumbilical area. A curvilinear incision was performed with scalpel infraumbilically. Dissection was carried down to the muscular fascia.  The hernia sac was recognized and it was dissected circumferentially from its surrounding attachments.  This was done with a combination of Bovie electrocautery and blunt dissection.  The hernia sac was detached from the umbilical stalk.   to the defects were connected measuring in total of 3 x 3 cm.  There was another small 3 x 3 mm defect superior to the  main defect.   there was preperitoneal fat going through the defect.  This was dissected circumferentially and the fat was then transected after this was encircled with 3-0 Vicryl.  The hernia contents were reduced. A retro-muscular preperitoneal plane was created.  An area of entry into the peritoneal cavity was noticed and this was closed with a running 3-0 Vicryl suture. At this point a Ventralex ST 8 cm was brought to the operating room.  The hernia patch was positioned in the preperitoneal space with adequate overlap of the defect.     The muscular fascia was then dissected circumferentially to obtain at least 2 cm of healthy fascia.  The small 3 mm defect was closed with interrupted 0 Vicryl suture.  The hernia defect was then closed with figure of eight  #1 PDS sutures. The medial suture incorporated the mesh straps.  The umbilical stalk was sutured to the anterior abdominal wall with U stitch and 3-0 Vicryl.  The subcutaneous tissue was closed in 2 layers with 3-0 Vicryl and 4-0 Monocryl.  The wound was closed with surgical glue.     The patient tolerated the procedure well.  Instrument and sponge count were correct.    The patient was extubated in the recovery room in stable condition.  I, the attending surgeon, performed the entire operation.    Vince Jaramillo MD  General, Minimally Invasive and Endoscopic Surgery  [facility]

## 2025-02-03 NOTE — ANESTHESIA POSTPROCEDURE EVALUATION
Patient: De Chau    Procedure Summary       Date: 02/03/25 Room / Location: [facility] OR [address] [place] [address] MAIN OR    Anesthesia Start: 0946 Anesthesia Stop: 1110    Procedure: open UMBILICAL HERNIA REPAIR with mesh placement (Abdomen) Diagnosis:       Umbilical hernia without obstruction and without gangrene      (Umbilical hernia without obstruction and without gangrene [K42.9])    Surgeons: Vince Jaramillo MD Provider: Benigno Ta MD    Anesthesia Type: general ASA Status: 2            Anesthesia Type: general    Vitals  Vitals Value Taken Time   /95 02/03/25 1200   Temp 36.9 °C (98.5 °F) 02/03/25 1113   Pulse 78 02/03/25 1207   Resp 16 02/03/25 1200   SpO2 100 % 02/03/25 1207   Vitals shown include unfiled device data.        Anesthesia Post Evaluation

## 2025-02-03 NOTE — DISCHARGE INSTRUCTIONS
Dr. Vince Jaramillo  [address]  [place], [place] [address] [phone]    Discharge Instructions for Hernia Surgery      Go home, rest and take it easy today; however, you should get up and move about several times today to reduce the risk of developing a clot in your legs.      You may experience some dizziness or memory loss from the anesthesia.  This may last for the next 24 hours.  Someone should plan on staying with you for the first 24 hours for your safety.    Do not make any important legal decisions or sign any legal papers for the next 24 hours.      Eat and drink lightly today.  Start off with liquids, jello, soup, crackers or other bland foods at first. You may advance your diet tomorrow as tolerated as long as you do not experience any nausea or vomiting.     You may remove your outer dressings in 3 days.  The white tapes called steri-strips should stay in place.  They will fall off on their own in 1-2 weeks.  Do not worry if they come off sooner.      You may notice some bleeding/drainage on your outer dressings. A little bloody drainage is normal. If the bleeding/drainage is such that the bandage cannot absorb it, remove the dressing, apply clean gauze and apply firm pressure for a full 15 minutes.  If the bleeding continues, please call me.    You may shower tomorrow.  No tub baths until your incisions are completely healed.      No lifting > 20 lbs. until you are seen at your follow-up visit.         You have received a prescription for a narcotic pain medicine, as you will have some pain following surgery.   You will not be totally pain free, but your pain medicine should make the pain tolerable.  Please take your pain medicine as prescribed and always take your pills with food to prevent nausea. If you are having severe pain that cannot be controlled by the pain medicine, please contact me.      You have also received a prescription for an anti-nausea medicine.  Please take this as  prescribed for any nausea or vomiting.  Nausea could be a result of the anesthesia or a result of the narcotic pain medicine.  If you experience severe nausea and vomiting that cannot be controlled by the nausea medicine, please call me.      If you had a laparoscopic surgery, it is not unusual to experience pain/discomfort in your shoulders or under your ribs after surgery.  It is from the gas used during the laparoscopic procedure and usually lasts 1-3 days.  The prescription pain medicine is used to treat the surgical pain and does not typically alleviate this “gassy” pain.     No driving for 24 hours and for as long as you are taking your prescription pain medicine.              You will need to call the office at [phone] to schedule a follow-up appointment in 6-10 days.     Remember to contact me for any of the following:    Fever > 101 degrees  Severe pain that cannot be controlled by taking your pain pills  Severe nausea or vomiting that cannot be controlled by taking your nausea pills  Significant bleeding of your incisions  Drainage that has a bad smell or is yellow or green in appearance  Any other questions or concerns        Additional Instruction for Inguinal Hernia Patients Only    If you did not urinate at the hospital after your surgery or if you feel the need to urinate and cannot, this will necessitate a return to the Emergency Room for placement of a urinary catheter.  You should also notify me as well.  As a rule, you should be able to empty your bladder within 4-6 hours after discharge from the hospital.      You may notice some scrotal bruising and/or swelling. A scrotal support or briefs as well as ice packs may be used to alleviate discomfort.

## 2025-02-03 NOTE — ANESTHESIA PREPROCEDURE EVALUATION
Anesthesia Evaluation     Patient summary reviewed and Nursing notes reviewed   NPO Solid Status: > 6 hours  NPO Liquid Status: > 2 hours           Airway   Mallampati: II  TM distance: >3 FB  Neck ROM: full  Dental - normal exam         Pulmonary    (-) COPD, asthma, not a smoker  Cardiovascular   Exercise tolerance: good (4-7 METS)    ECG reviewed    (+) hyperlipidemia  (-) past MI, dysrhythmias, angina    ROS comment: Stress test 2022 nl    TTE 2022 nl LV/Rv systolic fxn, gr 1 diastolic dysfxn, no significant valve issues     Neuro/Psych  (-) seizures, CVA  GI/Hepatic/Renal/Endo    (-) GERD, liver disease, no renal disease, diabetes, no thyroid disorder    Musculoskeletal     Abdominal    Substance History       Comment: Some MJ and alcohol use    OB/GYN          Other                    Anesthesia Plan    ASA 2     general       Anesthetic plan, risks, benefits, and alternatives have been provided, discussed and informed consent has been obtained with: patient.    CODE STATUS:

## 2025-02-03 NOTE — ANESTHESIA PROCEDURE NOTES
Airway  Urgency: elective    Date/Time: 2/3/2025 10:00 AM  Airway not difficult    General Information and Staff    Patient location during procedure: OR  Anesthesiologist: Benigno Ta MD  CRNA/CAA: Carey Alanis CRNA    Indications and Patient Condition  Indications for airway management: airway protection    Preoxygenated: yes  MILS maintained throughout  Mask difficulty assessment: 2 - vent by mask + OA or adjuvant +/- NMBA    Final Airway Details  Final airway type: endotracheal airway      Successful airway: ETT  Cuffed: yes   Successful intubation technique: direct laryngoscopy  Facilitating devices/methods: intubating stylet and cricoid pressure  Endotracheal tube insertion site: oral  Blade: Azar  Blade size: 4  ETT size (mm): 7.5  Cormack-Lehane Classification: grade IIb - view of arytenoids or posterior of glottis only  Placement verified by: chest auscultation and capnometry   Cuff volume (mL): 9  Measured from: lips  ETT/EBT  to lips (cm): 22  Number of attempts at approach: 2  Assessment: lips, teeth, and gum same as pre-op and atraumatic intubation    Additional Comments  Limited mouth opening

## 2025-02-10 ENCOUNTER — TELEPHONE (OUTPATIENT)
Dept: SURGERY | Facility: CLINIC | Age: 40
End: 2025-02-10
Payer: MEDICARE

## 2025-02-10 NOTE — TELEPHONE ENCOUNTER
Patient called needing to schedule a post operative appointment following umbilical hernia repair on 02/03. My next available is 02/14. Ok to double book 02/13 or schedule with Sanjay Wednesday?

## 2025-02-13 ENCOUNTER — OFFICE VISIT (OUTPATIENT)
Dept: SURGERY | Facility: CLINIC | Age: 40
End: 2025-02-13
Payer: MEDICARE

## 2025-02-13 VITALS
WEIGHT: 198 LBS | HEIGHT: 69 IN | DIASTOLIC BLOOD PRESSURE: 82 MMHG | BODY MASS INDEX: 29.33 KG/M2 | HEART RATE: 78 BPM | OXYGEN SATURATION: 99 % | SYSTOLIC BLOOD PRESSURE: 134 MMHG

## 2025-02-13 DIAGNOSIS — Z51.89 VISIT FOR WOUND CHECK: Primary | ICD-10-CM

## 2025-02-13 DIAGNOSIS — Z09 POSTOP CHECK: ICD-10-CM

## 2025-02-13 NOTE — PROGRESS NOTES
"Cc: Post-op check hernia surgery    S: De Chau is a 40 y.o. male patient here for follow up of his umbilical hernia repair with mesh on 2/3/2025.  The patient is doing well, and has no specific complaints.  Denies problems with incisions, fever, chills, nausea, vomiting, diarrhea or constipation.    O:   Vitals:    02/13/25 0803   BP: 134/82   BP Location: Left arm   Patient Position: Sitting   Cuff Size: Adult   Pulse: 78   SpO2: 99%   Weight: 89.8 kg (198 lb)   Height: 174 cm (68.5\")   Alert, no acute distress  Breathing comfortable  There is a moderate size seroma, there is detachment of the umbilicus from the fascia.  There is no evidence of hernia recurrence, there is mild tenderness over the area.  There is no skin color changes  Abdomen: soft, non tender.     A/P Patient s/p uncomplicated umbilical hernia repair with mesh.  He had developed a postoperative seroma with eversion of the umbilical stalk.  Otherwise repair looks good.  Discussed with the patient that seroma will eventually reabsorb most likely not cause any problem.  I will follow-up in my office in 2 weeks for recheck    He was instructed to continue with limited activity for the time being.      At 4 weeks he may resume light aerobic activity, followed by a trial of lifting heavier objects at 6 weeks.      The patient was instructed to call with any difficulties.      Vince Jaramillo MD  General, Minimally Invasive and Endoscopic Surgery  [facility] [place], operated by [facility]    [address], [address]  [place], [place], [address]  P: [phone]  F: [phone]    "

## 2025-02-25 ENCOUNTER — TELEPHONE (OUTPATIENT)
Dept: SURGERY | Facility: CLINIC | Age: 40
End: 2025-02-25
Payer: MEDICARE

## 2025-02-27 ENCOUNTER — OFFICE VISIT (OUTPATIENT)
Dept: SURGERY | Facility: CLINIC | Age: 40
End: 2025-02-27
Payer: MEDICARE

## 2025-02-27 VITALS
HEART RATE: 98 BPM | SYSTOLIC BLOOD PRESSURE: 130 MMHG | BODY MASS INDEX: 29.92 KG/M2 | WEIGHT: 202 LBS | OXYGEN SATURATION: 99 % | DIASTOLIC BLOOD PRESSURE: 82 MMHG | HEIGHT: 69 IN

## 2025-02-27 DIAGNOSIS — Z51.89 VISIT FOR WOUND CHECK: Primary | ICD-10-CM

## 2025-02-27 PROCEDURE — 1160F RVW MEDS BY RX/DR IN RCRD: CPT | Performed by: SURGERY

## 2025-02-27 PROCEDURE — 1159F MED LIST DOCD IN RCRD: CPT | Performed by: SURGERY

## 2025-02-27 PROCEDURE — 99212 OFFICE O/P EST SF 10 MIN: CPT | Performed by: SURGERY

## 2025-02-27 NOTE — PROGRESS NOTES
"Cc: Follow-up after umbilical hernia repair    S: Patient is here today for follow-up after undergoing umbilical hernia repair with mesh placement on 2/3/2025.  Patient had a small seroma.  He is feeling great and denies any complaint.     O:   Vitals:    02/27/25 0842   BP: 130/82   Pulse: 98   SpO2: 99%   Weight: 91.6 kg (202 lb)   Height: 174 cm (68.5\")      Alert, no acute distress  Breathing comfortable  Abdomen soft, nontender nondistended, small seroma under the skin at the umbilicus that is slightly everted.  There is no evidence of hernia recurrence    Assessment and plan    Postoperative visit after undergoing open umbilical hernia repair with mesh placement complicated with small seroma and eversion of the umbilical sac.  Discussed with the patient about the need to try to avoid heavy lifting for the first 6 weeks after surgery.  He can start aerobic type exercise in 4 weeks.  He will follow-up in my office in a month for recheck to ensure resolution of his seroma.  He understood    Vince Jaramillo MD, FACS  General, Minimally Invasive and Endoscopic Surgery  [facility]    [address], [address]  [place], [place], [address]  P: [phone]  F: [phone]    "

## 2025-03-27 ENCOUNTER — OFFICE VISIT (OUTPATIENT)
Dept: SURGERY | Facility: CLINIC | Age: 40
End: 2025-03-27
Payer: MEDICARE

## 2025-03-27 VITALS
SYSTOLIC BLOOD PRESSURE: 130 MMHG | DIASTOLIC BLOOD PRESSURE: 90 MMHG | HEIGHT: 69 IN | WEIGHT: 204 LBS | OXYGEN SATURATION: 97 % | BODY MASS INDEX: 30.21 KG/M2 | HEART RATE: 82 BPM

## 2025-03-27 DIAGNOSIS — Z51.89 VISIT FOR WOUND CHECK: Primary | ICD-10-CM

## 2025-03-27 DIAGNOSIS — Z09 POSTOP CHECK: ICD-10-CM

## 2025-03-27 NOTE — PROGRESS NOTES
"Cc: Umbilical hernia repair follow-up    S: Patient is a very pleasant 40-year-old male that is here today after undergoing open umbilical hernia repair with mesh placement on 2/3/2025.  This was complicated by postoperative seroma.  He is here today for follow-up.  He is doing great and denies any complaint.  The seroma has completely resolved.    O:   Vitals:    03/27/25 0950   BP: 130/90   Pulse: 82   SpO2: 97%   Weight: 92.5 kg (204 lb)   Height: 174 cm (68.5\")      Alert, no acute distress  Abdomen soft, nontender, nondistended, umbilical incision well-healed, no signs of hernia recurrence    Assessment and plan    40-year-old male status post open umbilical hernia repair with mesh placement complicated by postoperative seroma.  Wound is completely healed.  There is no signs of hernia recurrence.  Patient to start activity as tolerated.  No more restrictions.    Follow-up in my office as needed  "

## (undated) DEVICE — GLV SURG BIOGEL LTX PF 7 1/2

## (undated) DEVICE — ELECTRD BLD EZ CLN MOD XLNG 2.75IN

## (undated) DEVICE — EXTENSION SET, MALE LUER LOCK ADAPTER WITH RETRACTABLE COLLAR

## (undated) DEVICE — DRAPE,REIN 53X77,STERILE: Brand: MEDLINE

## (undated) DEVICE — SPONGE,LAP,18"X18",DLX,XR,ST,5/PK,40/PK: Brand: MEDLINE

## (undated) DEVICE — STPCK 3WY D201 DISCOFIX

## (undated) DEVICE — SUT VIC 0 TIES 18IN J912G

## (undated) DEVICE — SYR LL TP 10ML STRL

## (undated) DEVICE — SUT PDS 0 CT2 27IN DYED Z334H

## (undated) DEVICE — ENDOCUT SCISSOR TIP, DISPOSABLE: Brand: RENEW

## (undated) DEVICE — SOL NACL 0.9PCT 1000ML

## (undated) DEVICE — ENDOPATH XCEL BLADELESS TROCARS WITH STABILITY SLEEVES: Brand: ENDOPATH XCEL

## (undated) DEVICE — STPLR SKIN VISISTAT WD 35CT

## (undated) DEVICE — SUT MNCRYL PLS ANTIB UD 4/0 PS2 18IN

## (undated) DEVICE — LOU LAP CHOLE: Brand: MEDLINE INDUSTRIES, INC.

## (undated) DEVICE — APPL CHLORAPREP HI/LITE 26ML ORNG

## (undated) DEVICE — LOU MINOR PROCEDURE: Brand: MEDLINE INDUSTRIES, INC.

## (undated) DEVICE — NDL HYPO PRECISIONGLIDE REG 25G 1 1/2

## (undated) DEVICE — DISPOSABLE MONOPOLAR ENDOSCOPIC CORD 10 FT. (3M): Brand: KIRWAN

## (undated) DEVICE — PATIENT RETURN ELECTRODE, SINGLE-USE, CONTACT QUALITY MONITORING, ADULT, WITH 9FT CORD, FOR PATIENTS WEIGING OVER 33LBS. (15KG): Brand: MEGADYNE

## (undated) DEVICE — CATH CHOLANG 4.5F18IN BRGNDY

## (undated) DEVICE — CATH IV INSYTE AUTOGARD 14G 1 1/2IN ORNG

## (undated) DEVICE — ANTIBACTERIAL UNDYED BRAIDED (POLYGLACTIN 910), SYNTHETIC ABSORBABLE SUTURE: Brand: COATED VICRYL

## (undated) DEVICE — DRP C/ARM 41X74IN

## (undated) DEVICE — KT CLN CLEANOR SCPE

## (undated) DEVICE — ENDOPOUCH RETRIEVER SPECIMEN RETRIEVAL BAGS: Brand: ENDOPOUCH RETRIEVER

## (undated) DEVICE — ENDOPATH XCEL UNIVERSAL TROCAR STABLILITY SLEEVES: Brand: ENDOPATH XCEL